# Patient Record
Sex: MALE | Race: WHITE | Employment: OTHER | URBAN - METROPOLITAN AREA
[De-identification: names, ages, dates, MRNs, and addresses within clinical notes are randomized per-mention and may not be internally consistent; named-entity substitution may affect disease eponyms.]

---

## 2018-12-28 ENCOUNTER — HOSPITAL ENCOUNTER (INPATIENT)
Age: 63
LOS: 1 days | Discharge: HOME | DRG: 246 | End: 2018-12-30
Attending: EMERGENCY MEDICINE | Admitting: HOSPITALIST

## 2018-12-28 ENCOUNTER — APPOINTMENT (OUTPATIENT)
Dept: GENERAL RADIOLOGY | Age: 63
DRG: 246 | End: 2018-12-28

## 2018-12-28 DIAGNOSIS — R77.8 ELEVATED TROPONIN: ICD-10-CM

## 2018-12-28 DIAGNOSIS — I21.4 NSTEMI (NON-ST ELEVATED MYOCARDIAL INFARCTION) (HCC): Primary | ICD-10-CM

## 2018-12-28 DIAGNOSIS — R07.9 CHEST PAIN, UNSPECIFIED TYPE: ICD-10-CM

## 2018-12-28 LAB
ALBUMIN SERPL-MCNC: 4.1 GM/DL (ref 3.4–5)
ALP BLD-CCNC: 74 IU/L (ref 40–129)
ALT SERPL-CCNC: 23 U/L (ref 10–40)
ANION GAP SERPL CALCULATED.3IONS-SCNC: 12 MMOL/L (ref 4–16)
AST SERPL-CCNC: 41 IU/L (ref 15–37)
BASOPHILS ABSOLUTE: 0.1 K/CU MM
BASOPHILS RELATIVE PERCENT: 0.8 % (ref 0–1)
BILIRUB SERPL-MCNC: 0.1 MG/DL (ref 0–1)
BUN BLDV-MCNC: 24 MG/DL (ref 6–23)
CALCIUM SERPL-MCNC: 9.3 MG/DL (ref 8.3–10.6)
CHLORIDE BLD-SCNC: 98 MMOL/L (ref 99–110)
CO2: 29 MMOL/L (ref 21–32)
CREAT SERPL-MCNC: 1 MG/DL (ref 0.9–1.3)
DIFFERENTIAL TYPE: ABNORMAL
EOSINOPHILS ABSOLUTE: 0.3 K/CU MM
EOSINOPHILS RELATIVE PERCENT: 4.2 % (ref 0–3)
GFR AFRICAN AMERICAN: >60 ML/MIN/1.73M2
GFR NON-AFRICAN AMERICAN: >60 ML/MIN/1.73M2
GLUCOSE BLD-MCNC: 99 MG/DL (ref 70–99)
HCT VFR BLD CALC: 46 % (ref 42–52)
HEMOGLOBIN: 14.7 GM/DL (ref 13.5–18)
IMMATURE NEUTROPHIL %: 0.1 % (ref 0–0.43)
LYMPHOCYTES ABSOLUTE: 2.7 K/CU MM
LYMPHOCYTES RELATIVE PERCENT: 34.6 % (ref 24–44)
MCH RBC QN AUTO: 29.1 PG (ref 27–31)
MCHC RBC AUTO-ENTMCNC: 32 % (ref 32–36)
MCV RBC AUTO: 90.9 FL (ref 78–100)
MONOCYTES ABSOLUTE: 0.7 K/CU MM
MONOCYTES RELATIVE PERCENT: 9 % (ref 0–4)
NUCLEATED RBC %: 0 %
PDW BLD-RTO: 13.2 % (ref 11.7–14.9)
PLATELET # BLD: 287 K/CU MM (ref 140–440)
PMV BLD AUTO: 9.9 FL (ref 7.5–11.1)
POTASSIUM SERPL-SCNC: 3.6 MMOL/L (ref 3.5–5.1)
RBC # BLD: 5.06 M/CU MM (ref 4.6–6.2)
SEGMENTED NEUTROPHILS ABSOLUTE COUNT: 3.9 K/CU MM
SEGMENTED NEUTROPHILS RELATIVE PERCENT: 51.3 % (ref 36–66)
SODIUM BLD-SCNC: 139 MMOL/L (ref 135–145)
TOTAL IMMATURE NEUTOROPHIL: 0.01 K/CU MM
TOTAL NUCLEATED RBC: 0 K/CU MM
TOTAL PROTEIN: 6.9 GM/DL (ref 6.4–8.2)
TROPONIN T: 0.73 NG/ML
WBC # BLD: 7.7 K/CU MM (ref 4–10.5)

## 2018-12-28 PROCEDURE — 85025 COMPLETE CBC W/AUTO DIFF WBC: CPT

## 2018-12-28 PROCEDURE — 85730 THROMBOPLASTIN TIME PARTIAL: CPT

## 2018-12-28 PROCEDURE — 71046 X-RAY EXAM CHEST 2 VIEWS: CPT

## 2018-12-28 PROCEDURE — 6370000000 HC RX 637 (ALT 250 FOR IP): Performed by: EMERGENCY MEDICINE

## 2018-12-28 PROCEDURE — 84484 ASSAY OF TROPONIN QUANT: CPT

## 2018-12-28 PROCEDURE — 36415 COLL VENOUS BLD VENIPUNCTURE: CPT

## 2018-12-28 PROCEDURE — 99285 EMERGENCY DEPT VISIT HI MDM: CPT

## 2018-12-28 PROCEDURE — 93005 ELECTROCARDIOGRAM TRACING: CPT | Performed by: EMERGENCY MEDICINE

## 2018-12-28 PROCEDURE — 80053 COMPREHEN METABOLIC PANEL: CPT

## 2018-12-28 RX ORDER — HEPARIN SODIUM 1000 [USP'U]/ML
4000 INJECTION, SOLUTION INTRAVENOUS; SUBCUTANEOUS ONCE
Status: COMPLETED | OUTPATIENT
Start: 2018-12-28 | End: 2018-12-29

## 2018-12-28 RX ORDER — ASPIRIN 325 MG
325 TABLET ORAL ONCE
Status: COMPLETED | OUTPATIENT
Start: 2018-12-28 | End: 2018-12-28

## 2018-12-28 RX ORDER — HEPARIN SODIUM 1000 [USP'U]/ML
4000 INJECTION, SOLUTION INTRAVENOUS; SUBCUTANEOUS PRN
Status: DISCONTINUED | OUTPATIENT
Start: 2018-12-28 | End: 2018-12-29

## 2018-12-28 RX ORDER — HEPARIN SODIUM 10000 [USP'U]/100ML
10.6 INJECTION, SOLUTION INTRAVENOUS CONTINUOUS
Status: DISCONTINUED | OUTPATIENT
Start: 2018-12-28 | End: 2018-12-29

## 2018-12-28 RX ORDER — HEPARIN SODIUM 1000 [USP'U]/ML
2000 INJECTION, SOLUTION INTRAVENOUS; SUBCUTANEOUS PRN
Status: DISCONTINUED | OUTPATIENT
Start: 2018-12-28 | End: 2018-12-29

## 2018-12-28 RX ADMIN — ASPIRIN 325 MG ORAL TABLET 325 MG: 325 PILL ORAL at 23:41

## 2018-12-28 ASSESSMENT — PAIN DESCRIPTION - ORIENTATION: ORIENTATION: MID

## 2018-12-28 ASSESSMENT — PAIN DESCRIPTION - PAIN TYPE: TYPE: ACUTE PAIN

## 2018-12-28 ASSESSMENT — PAIN DESCRIPTION - LOCATION: LOCATION: CHEST

## 2018-12-28 ASSESSMENT — PAIN SCALES - GENERAL: PAINLEVEL_OUTOF10: 4

## 2018-12-29 ENCOUNTER — DIRECT ADMIT ORDERS (OUTPATIENT)
Dept: CARDIOLOGY | Age: 63
End: 2018-12-29

## 2018-12-29 PROBLEM — I21.4 NSTEMI (NON-ST ELEVATED MYOCARDIAL INFARCTION) (HCC): Status: ACTIVE | Noted: 2018-12-29

## 2018-12-29 LAB
ACTIVATED CLOTTING TIME, LOW RANGE: 134 SEC
ACTIVATED CLOTTING TIME, LOW RANGE: >400 SEC
APTT: 26.1 SECONDS (ref 21.2–33)
APTT: 27 SECONDS (ref 21.2–33)
CHOLESTEROL: 281 MG/DL
EKG ATRIAL RATE: 75 BPM
EKG DIAGNOSIS: NORMAL
EKG P AXIS: 32 DEGREES
EKG P-R INTERVAL: 158 MS
EKG Q-T INTERVAL: 370 MS
EKG QRS DURATION: 102 MS
EKG QTC CALCULATION (BAZETT): 413 MS
EKG R AXIS: -33 DEGREES
EKG T AXIS: -29 DEGREES
EKG VENTRICULAR RATE: 75 BPM
HDLC SERPL-MCNC: 52 MG/DL
LDL CHOLESTEROL DIRECT: 233 MG/DL
LV EF: 53 %
LVEF MODALITY: NORMAL
TRIGL SERPL-MCNC: 124 MG/DL
TROPONIN T: 0.88 NG/ML
TROPONIN T: 0.99 NG/ML

## 2018-12-29 PROCEDURE — C1769 GUIDE WIRE: HCPCS

## 2018-12-29 PROCEDURE — 36415 COLL VENOUS BLD VENIPUNCTURE: CPT

## 2018-12-29 PROCEDURE — 4A023N7 MEASUREMENT OF CARDIAC SAMPLING AND PRESSURE, LEFT HEART, PERCUTANEOUS APPROACH: ICD-10-PCS | Performed by: INTERNAL MEDICINE

## 2018-12-29 PROCEDURE — 6360000004 HC RX CONTRAST MEDICATION

## 2018-12-29 PROCEDURE — 2500000003 HC RX 250 WO HCPCS

## 2018-12-29 PROCEDURE — B2111ZZ FLUOROSCOPY OF MULTIPLE CORONARY ARTERIES USING LOW OSMOLAR CONTRAST: ICD-10-PCS | Performed by: INTERNAL MEDICINE

## 2018-12-29 PROCEDURE — C1894 INTRO/SHEATH, NON-LASER: HCPCS

## 2018-12-29 PROCEDURE — 6370000000 HC RX 637 (ALT 250 FOR IP)

## 2018-12-29 PROCEDURE — 84484 ASSAY OF TROPONIN QUANT: CPT

## 2018-12-29 PROCEDURE — 94761 N-INVAS EAR/PLS OXIMETRY MLT: CPT

## 2018-12-29 PROCEDURE — 85347 COAGULATION TIME ACTIVATED: CPT

## 2018-12-29 PROCEDURE — C1887 CATHETER, GUIDING: HCPCS

## 2018-12-29 PROCEDURE — 2140000000 HC CCU INTERMEDIATE R&B

## 2018-12-29 PROCEDURE — 92928 PRQ TCAT PLMT NTRAC ST 1 LES: CPT

## 2018-12-29 PROCEDURE — 85730 THROMBOPLASTIN TIME PARTIAL: CPT

## 2018-12-29 PROCEDURE — 2580000003 HC RX 258: Performed by: INTERNAL MEDICINE

## 2018-12-29 PROCEDURE — 93458 L HRT ARTERY/VENTRICLE ANGIO: CPT

## 2018-12-29 PROCEDURE — 027137Z DILATION OF CORONARY ARTERY, TWO ARTERIES WITH FOUR OR MORE DRUG-ELUTING INTRALUMINAL DEVICES, PERCUTANEOUS APPROACH: ICD-10-PCS | Performed by: INTERNAL MEDICINE

## 2018-12-29 PROCEDURE — 6360000002 HC RX W HCPCS: Performed by: EMERGENCY MEDICINE

## 2018-12-29 PROCEDURE — C1725 CATH, TRANSLUMIN NON-LASER: HCPCS

## 2018-12-29 PROCEDURE — 92928 PRQ TCAT PLMT NTRAC ST 1 LES: CPT | Performed by: INTERNAL MEDICINE

## 2018-12-29 PROCEDURE — B2151ZZ FLUOROSCOPY OF LEFT HEART USING LOW OSMOLAR CONTRAST: ICD-10-PCS | Performed by: INTERNAL MEDICINE

## 2018-12-29 PROCEDURE — C1874 STENT, COATED/COV W/DEL SYS: HCPCS

## 2018-12-29 PROCEDURE — 83721 ASSAY OF BLOOD LIPOPROTEIN: CPT

## 2018-12-29 PROCEDURE — 93458 L HRT ARTERY/VENTRICLE ANGIO: CPT | Performed by: INTERNAL MEDICINE

## 2018-12-29 PROCEDURE — 2580000003 HC RX 258: Performed by: HOSPITALIST

## 2018-12-29 PROCEDURE — 6370000000 HC RX 637 (ALT 250 FOR IP): Performed by: HOSPITALIST

## 2018-12-29 PROCEDURE — 80061 LIPID PANEL: CPT

## 2018-12-29 PROCEDURE — 6360000002 HC RX W HCPCS

## 2018-12-29 PROCEDURE — 2709999900 HC NON-CHARGEABLE SUPPLY

## 2018-12-29 PROCEDURE — 6370000000 HC RX 637 (ALT 250 FOR IP): Performed by: INTERNAL MEDICINE

## 2018-12-29 PROCEDURE — 2580000003 HC RX 258

## 2018-12-29 PROCEDURE — C1760 CLOSURE DEV, VASC: HCPCS

## 2018-12-29 PROCEDURE — 99253 IP/OBS CNSLTJ NEW/EST LOW 45: CPT | Performed by: INTERNAL MEDICINE

## 2018-12-29 RX ORDER — SODIUM CHLORIDE 0.9 % (FLUSH) 0.9 %
10 SYRINGE (ML) INJECTION PRN
Status: DISCONTINUED | OUTPATIENT
Start: 2018-12-29 | End: 2018-12-29

## 2018-12-29 RX ORDER — HEPARIN SODIUM 1000 [USP'U]/ML
30 INJECTION, SOLUTION INTRAVENOUS; SUBCUTANEOUS PRN
Status: DISCONTINUED | OUTPATIENT
Start: 2018-12-29 | End: 2018-12-29

## 2018-12-29 RX ORDER — DIAZEPAM 5 MG/1
5 TABLET ORAL
Status: CANCELLED | OUTPATIENT
Start: 2018-12-29 | End: 2018-12-29

## 2018-12-29 RX ORDER — DIPHENHYDRAMINE HCL 25 MG
25 TABLET ORAL
Status: CANCELLED | OUTPATIENT
Start: 2018-12-29 | End: 2018-12-29

## 2018-12-29 RX ORDER — ACETAMINOPHEN 325 MG/1
650 TABLET ORAL EVERY 4 HOURS PRN
Status: DISCONTINUED | OUTPATIENT
Start: 2018-12-29 | End: 2018-12-30 | Stop reason: HOSPADM

## 2018-12-29 RX ORDER — SODIUM CHLORIDE 0.9 % (FLUSH) 0.9 %
10 SYRINGE (ML) INJECTION EVERY 12 HOURS SCHEDULED
Status: DISCONTINUED | OUTPATIENT
Start: 2018-12-29 | End: 2018-12-29

## 2018-12-29 RX ORDER — LISINOPRIL 5 MG/1
5 TABLET ORAL DAILY
Status: DISCONTINUED | OUTPATIENT
Start: 2018-12-29 | End: 2018-12-30 | Stop reason: HOSPADM

## 2018-12-29 RX ORDER — DIPHENHYDRAMINE HCL 25 MG
25 TABLET ORAL
Status: COMPLETED | OUTPATIENT
Start: 2018-12-29 | End: 2018-12-29

## 2018-12-29 RX ORDER — ATORVASTATIN CALCIUM 40 MG/1
80 TABLET, FILM COATED ORAL NIGHTLY
Status: DISCONTINUED | OUTPATIENT
Start: 2018-12-29 | End: 2018-12-30 | Stop reason: HOSPADM

## 2018-12-29 RX ORDER — FAMOTIDINE 20 MG/1
20 TABLET, FILM COATED ORAL 2 TIMES DAILY
Status: DISCONTINUED | OUTPATIENT
Start: 2018-12-29 | End: 2018-12-30 | Stop reason: HOSPADM

## 2018-12-29 RX ORDER — SODIUM CHLORIDE 0.9 % (FLUSH) 0.9 %
10 SYRINGE (ML) INJECTION PRN
Status: CANCELLED | OUTPATIENT
Start: 2018-12-29

## 2018-12-29 RX ORDER — NITROGLYCERIN 0.4 MG/1
0.4 TABLET SUBLINGUAL EVERY 5 MIN PRN
Status: DISCONTINUED | OUTPATIENT
Start: 2018-12-29 | End: 2018-12-30 | Stop reason: HOSPADM

## 2018-12-29 RX ORDER — ONDANSETRON 2 MG/ML
4 INJECTION INTRAMUSCULAR; INTRAVENOUS EVERY 6 HOURS PRN
Status: DISCONTINUED | OUTPATIENT
Start: 2018-12-29 | End: 2018-12-30 | Stop reason: HOSPADM

## 2018-12-29 RX ORDER — DIAZEPAM 5 MG/1
5 TABLET ORAL
Status: DISCONTINUED | OUTPATIENT
Start: 2018-12-29 | End: 2018-12-29

## 2018-12-29 RX ORDER — SODIUM CHLORIDE 0.9 % (FLUSH) 0.9 %
10 SYRINGE (ML) INJECTION EVERY 12 HOURS SCHEDULED
Status: CANCELLED | OUTPATIENT
Start: 2018-12-29

## 2018-12-29 RX ORDER — ATORVASTATIN CALCIUM 40 MG/1
40 TABLET, FILM COATED ORAL NIGHTLY
Status: DISCONTINUED | OUTPATIENT
Start: 2018-12-29 | End: 2018-12-29

## 2018-12-29 RX ORDER — SODIUM CHLORIDE 9 MG/ML
INJECTION, SOLUTION INTRAVENOUS CONTINUOUS
Status: DISCONTINUED | OUTPATIENT
Start: 2018-12-29 | End: 2018-12-29

## 2018-12-29 RX ORDER — SODIUM CHLORIDE 0.9 % (FLUSH) 0.9 %
10 SYRINGE (ML) INJECTION EVERY 12 HOURS SCHEDULED
Status: DISCONTINUED | OUTPATIENT
Start: 2018-12-29 | End: 2018-12-30 | Stop reason: HOSPADM

## 2018-12-29 RX ORDER — ASPIRIN 81 MG/1
81 TABLET, CHEWABLE ORAL DAILY
Status: DISCONTINUED | OUTPATIENT
Start: 2018-12-30 | End: 2018-12-30 | Stop reason: HOSPADM

## 2018-12-29 RX ORDER — HEPARIN SODIUM 1000 [USP'U]/ML
60 INJECTION, SOLUTION INTRAVENOUS; SUBCUTANEOUS PRN
Status: DISCONTINUED | OUTPATIENT
Start: 2018-12-29 | End: 2018-12-29

## 2018-12-29 RX ORDER — SODIUM CHLORIDE 9 MG/ML
INJECTION, SOLUTION INTRAVENOUS CONTINUOUS
Status: DISPENSED | OUTPATIENT
Start: 2018-12-29 | End: 2018-12-30

## 2018-12-29 RX ORDER — SODIUM CHLORIDE 9 MG/ML
INJECTION, SOLUTION INTRAVENOUS CONTINUOUS
Status: CANCELLED | OUTPATIENT
Start: 2018-12-29

## 2018-12-29 RX ORDER — METOPROLOL SUCCINATE 25 MG/1
25 TABLET, EXTENDED RELEASE ORAL DAILY
Status: DISCONTINUED | OUTPATIENT
Start: 2018-12-29 | End: 2018-12-30 | Stop reason: HOSPADM

## 2018-12-29 RX ORDER — SODIUM CHLORIDE 0.9 % (FLUSH) 0.9 %
10 SYRINGE (ML) INJECTION PRN
Status: DISCONTINUED | OUTPATIENT
Start: 2018-12-29 | End: 2018-12-30 | Stop reason: HOSPADM

## 2018-12-29 RX ADMIN — SODIUM CHLORIDE: 9 INJECTION, SOLUTION INTRAVENOUS at 19:59

## 2018-12-29 RX ADMIN — TICAGRELOR 90 MG: 90 TABLET ORAL at 20:03

## 2018-12-29 RX ADMIN — HEPARIN SODIUM AND DEXTROSE 10.6 UNITS/KG/HR: 10000; 5 INJECTION INTRAVENOUS at 02:47

## 2018-12-29 RX ADMIN — SODIUM CHLORIDE: 9 INJECTION, SOLUTION INTRAVENOUS at 14:18

## 2018-12-29 RX ADMIN — HEPARIN SODIUM 4000 UNITS: 1000 INJECTION, SOLUTION INTRAVENOUS; SUBCUTANEOUS at 02:46

## 2018-12-29 RX ADMIN — SODIUM CHLORIDE, PRESERVATIVE FREE 10 ML: 5 INJECTION INTRAVENOUS at 02:47

## 2018-12-29 RX ADMIN — DIPHENHYDRAMINE HCL 25 MG: 25 TABLET ORAL at 10:00

## 2018-12-29 RX ADMIN — SODIUM CHLORIDE: 9 INJECTION, SOLUTION INTRAVENOUS at 09:15

## 2018-12-29 RX ADMIN — METOPROLOL SUCCINATE 25 MG: 25 TABLET, EXTENDED RELEASE ORAL at 08:50

## 2018-12-29 ASSESSMENT — ENCOUNTER SYMPTOMS
COUGH: 0
CONSTIPATION: 0
SORE THROAT: 0
DIARRHEA: 0
ABDOMINAL PAIN: 0
BACK PAIN: 0
VOMITING: 0
SHORTNESS OF BREATH: 0
NAUSEA: 1
EYE REDNESS: 0
RHINORRHEA: 0

## 2018-12-29 ASSESSMENT — PAIN SCALES - GENERAL: PAINLEVEL_OUTOF10: 0

## 2018-12-30 VITALS
WEIGHT: 205.19 LBS | HEART RATE: 93 BPM | RESPIRATION RATE: 21 BRPM | DIASTOLIC BLOOD PRESSURE: 77 MMHG | SYSTOLIC BLOOD PRESSURE: 119 MMHG | BODY MASS INDEX: 27.79 KG/M2 | HEIGHT: 72 IN | TEMPERATURE: 97.3 F | OXYGEN SATURATION: 100 %

## 2018-12-30 LAB
ANION GAP SERPL CALCULATED.3IONS-SCNC: 10 MMOL/L (ref 4–16)
BUN BLDV-MCNC: 15 MG/DL (ref 6–23)
CALCIUM SERPL-MCNC: 8.9 MG/DL (ref 8.3–10.6)
CHLORIDE BLD-SCNC: 104 MMOL/L (ref 99–110)
CO2: 28 MMOL/L (ref 21–32)
CREAT SERPL-MCNC: 1 MG/DL (ref 0.9–1.3)
GFR AFRICAN AMERICAN: >60 ML/MIN/1.73M2
GFR NON-AFRICAN AMERICAN: >60 ML/MIN/1.73M2
GLUCOSE BLD-MCNC: 108 MG/DL (ref 70–99)
HCT VFR BLD CALC: 44.3 % (ref 42–52)
HEMOGLOBIN: 14.1 GM/DL (ref 13.5–18)
MCH RBC QN AUTO: 28.6 PG (ref 27–31)
MCHC RBC AUTO-ENTMCNC: 31.8 % (ref 32–36)
MCV RBC AUTO: 89.9 FL (ref 78–100)
PDW BLD-RTO: 13.2 % (ref 11.7–14.9)
PLATELET # BLD: 249 K/CU MM (ref 140–440)
PMV BLD AUTO: 10.1 FL (ref 7.5–11.1)
POTASSIUM SERPL-SCNC: 3.9 MMOL/L (ref 3.5–5.1)
RBC # BLD: 4.93 M/CU MM (ref 4.6–6.2)
SODIUM BLD-SCNC: 142 MMOL/L (ref 135–145)
WBC # BLD: 8.1 K/CU MM (ref 4–10.5)

## 2018-12-30 PROCEDURE — 6370000000 HC RX 637 (ALT 250 FOR IP): Performed by: HOSPITALIST

## 2018-12-30 PROCEDURE — 80048 BASIC METABOLIC PNL TOTAL CA: CPT

## 2018-12-30 PROCEDURE — 99233 SBSQ HOSP IP/OBS HIGH 50: CPT | Performed by: INTERNAL MEDICINE

## 2018-12-30 PROCEDURE — 2580000003 HC RX 258: Performed by: INTERNAL MEDICINE

## 2018-12-30 PROCEDURE — 85730 THROMBOPLASTIN TIME PARTIAL: CPT

## 2018-12-30 PROCEDURE — 36415 COLL VENOUS BLD VENIPUNCTURE: CPT

## 2018-12-30 PROCEDURE — 6370000000 HC RX 637 (ALT 250 FOR IP): Performed by: INTERNAL MEDICINE

## 2018-12-30 PROCEDURE — 85027 COMPLETE CBC AUTOMATED: CPT

## 2018-12-30 RX ORDER — ATORVASTATIN CALCIUM 80 MG/1
80 TABLET, FILM COATED ORAL NIGHTLY
Qty: 30 TABLET | Refills: 0 | Status: SHIPPED | OUTPATIENT
Start: 2018-12-30 | End: 2019-01-28 | Stop reason: SDUPTHER

## 2018-12-30 RX ORDER — METOPROLOL SUCCINATE 25 MG/1
25 TABLET, EXTENDED RELEASE ORAL DAILY
Qty: 30 TABLET | Refills: 0 | Status: SHIPPED | OUTPATIENT
Start: 2018-12-31 | End: 2019-03-04 | Stop reason: SDUPTHER

## 2018-12-30 RX ORDER — FAMOTIDINE 20 MG/1
20 TABLET, FILM COATED ORAL 2 TIMES DAILY
Qty: 60 TABLET | Refills: 0 | Status: SHIPPED | OUTPATIENT
Start: 2018-12-30 | End: 2019-01-23

## 2018-12-30 RX ORDER — NITROGLYCERIN 0.4 MG/1
TABLET SUBLINGUAL
Qty: 25 TABLET | Refills: 0 | Status: SHIPPED | OUTPATIENT
Start: 2018-12-30

## 2018-12-30 RX ORDER — LISINOPRIL 5 MG/1
5 TABLET ORAL DAILY
Qty: 30 TABLET | Refills: 0 | Status: SHIPPED | OUTPATIENT
Start: 2018-12-31 | End: 2019-01-10

## 2018-12-30 RX ADMIN — METOPROLOL SUCCINATE 25 MG: 25 TABLET, EXTENDED RELEASE ORAL at 08:03

## 2018-12-30 RX ADMIN — ASPIRIN 81 MG 81 MG: 81 TABLET ORAL at 08:03

## 2018-12-30 RX ADMIN — SODIUM CHLORIDE, PRESERVATIVE FREE 10 ML: 5 INJECTION INTRAVENOUS at 08:03

## 2018-12-30 RX ADMIN — TICAGRELOR 90 MG: 90 TABLET ORAL at 08:03

## 2018-12-30 ASSESSMENT — PAIN SCALES - GENERAL: PAINLEVEL_OUTOF10: 0

## 2018-12-31 ENCOUNTER — TELEPHONE (OUTPATIENT)
Dept: CARDIOLOGY CLINIC | Age: 63
End: 2018-12-31

## 2019-01-10 ENCOUNTER — OFFICE VISIT (OUTPATIENT)
Dept: CARDIOLOGY CLINIC | Age: 64
End: 2019-01-10

## 2019-01-10 VITALS
SYSTOLIC BLOOD PRESSURE: 136 MMHG | WEIGHT: 201.4 LBS | HEIGHT: 72 IN | BODY MASS INDEX: 27.28 KG/M2 | DIASTOLIC BLOOD PRESSURE: 84 MMHG | HEART RATE: 63 BPM

## 2019-01-10 DIAGNOSIS — R07.9 CHEST PAIN, UNSPECIFIED TYPE: ICD-10-CM

## 2019-01-10 DIAGNOSIS — Z95.820 S/P ANGIOPLASTY WITH STENT: ICD-10-CM

## 2019-01-10 DIAGNOSIS — E78.5 DYSLIPIDEMIA: ICD-10-CM

## 2019-01-10 DIAGNOSIS — I25.10 CORONARY ARTERY DISEASE INVOLVING NATIVE CORONARY ARTERY OF NATIVE HEART WITHOUT ANGINA PECTORIS: Primary | ICD-10-CM

## 2019-01-10 DIAGNOSIS — I21.4 NSTEMI (NON-ST ELEVATED MYOCARDIAL INFARCTION) (HCC): ICD-10-CM

## 2019-01-10 PROCEDURE — 99213 OFFICE O/P EST LOW 20 MIN: CPT | Performed by: INTERNAL MEDICINE

## 2019-01-10 PROCEDURE — 93000 ELECTROCARDIOGRAM COMPLETE: CPT | Performed by: INTERNAL MEDICINE

## 2019-01-10 RX ORDER — CLOPIDOGREL BISULFATE 75 MG/1
75 TABLET ORAL DAILY
Qty: 30 TABLET | Refills: 5 | Status: SHIPPED | OUTPATIENT
Start: 2019-01-10 | End: 2019-07-05 | Stop reason: SDUPTHER

## 2019-01-14 ENCOUNTER — TELEPHONE (OUTPATIENT)
Dept: CARDIOLOGY CLINIC | Age: 64
End: 2019-01-14

## 2019-01-21 ENCOUNTER — PROCEDURE VISIT (OUTPATIENT)
Dept: CARDIOLOGY CLINIC | Age: 64
End: 2019-01-21

## 2019-01-21 VITALS
WEIGHT: 201 LBS | BODY MASS INDEX: 27.22 KG/M2 | HEIGHT: 72 IN | HEART RATE: 80 BPM | DIASTOLIC BLOOD PRESSURE: 78 MMHG | SYSTOLIC BLOOD PRESSURE: 122 MMHG

## 2019-01-21 DIAGNOSIS — I21.4 NSTEMI (NON-ST ELEVATED MYOCARDIAL INFARCTION) (HCC): ICD-10-CM

## 2019-01-21 DIAGNOSIS — R06.02 SHORTNESS OF BREATH: ICD-10-CM

## 2019-01-21 DIAGNOSIS — Z95.820 S/P ANGIOPLASTY WITH STENT: Primary | ICD-10-CM

## 2019-01-21 DIAGNOSIS — R07.9 CHEST PAIN, UNSPECIFIED TYPE: ICD-10-CM

## 2019-01-21 DIAGNOSIS — I25.10 CORONARY ARTERY DISEASE INVOLVING NATIVE CORONARY ARTERY OF NATIVE HEART WITHOUT ANGINA PECTORIS: ICD-10-CM

## 2019-01-21 DIAGNOSIS — E78.5 DYSLIPIDEMIA: ICD-10-CM

## 2019-01-21 PROCEDURE — 93015 CV STRESS TEST SUPVJ I&R: CPT | Performed by: INTERNAL MEDICINE

## 2019-01-23 ENCOUNTER — OFFICE VISIT (OUTPATIENT)
Dept: CARDIOLOGY CLINIC | Age: 64
End: 2019-01-23

## 2019-01-23 VITALS
HEART RATE: 78 BPM | BODY MASS INDEX: 27.22 KG/M2 | DIASTOLIC BLOOD PRESSURE: 70 MMHG | WEIGHT: 201 LBS | HEIGHT: 72 IN | SYSTOLIC BLOOD PRESSURE: 106 MMHG

## 2019-01-23 DIAGNOSIS — I25.10 CORONARY ARTERY DISEASE INVOLVING NATIVE CORONARY ARTERY OF NATIVE HEART WITHOUT ANGINA PECTORIS: Primary | ICD-10-CM

## 2019-01-23 DIAGNOSIS — E78.5 DYSLIPIDEMIA: ICD-10-CM

## 2019-01-23 DIAGNOSIS — I21.4 NSTEMI (NON-ST ELEVATED MYOCARDIAL INFARCTION) (HCC): ICD-10-CM

## 2019-01-23 DIAGNOSIS — Z95.820 S/P ANGIOPLASTY WITH STENT: ICD-10-CM

## 2019-01-23 DIAGNOSIS — R07.9 CHEST PAIN, UNSPECIFIED TYPE: ICD-10-CM

## 2019-01-23 DIAGNOSIS — R77.8 ELEVATED TROPONIN: ICD-10-CM

## 2019-01-23 PROCEDURE — 99213 OFFICE O/P EST LOW 20 MIN: CPT | Performed by: INTERNAL MEDICINE

## 2019-01-29 RX ORDER — ATORVASTATIN CALCIUM 80 MG/1
80 TABLET, FILM COATED ORAL NIGHTLY
Qty: 90 TABLET | Refills: 3 | Status: SHIPPED | OUTPATIENT
Start: 2019-01-29 | End: 2019-12-10

## 2019-01-30 ENCOUNTER — TELEPHONE (OUTPATIENT)
Dept: CARDIOLOGY CLINIC | Age: 64
End: 2019-01-30

## 2019-02-13 ENCOUNTER — HOSPITAL ENCOUNTER (OUTPATIENT)
Dept: CARDIAC REHAB | Age: 64
Setting detail: THERAPIES SERIES
Discharge: HOME | End: 2019-02-13

## 2019-02-19 ENCOUNTER — HOSPITAL ENCOUNTER (OUTPATIENT)
Dept: CARDIAC REHAB | Age: 64
Setting detail: THERAPIES SERIES
Discharge: HOME | End: 2019-02-19

## 2019-02-19 PROCEDURE — 93798 PHYS/QHP OP CAR RHAB W/ECG: CPT

## 2019-02-21 ENCOUNTER — HOSPITAL ENCOUNTER (OUTPATIENT)
Dept: CARDIAC REHAB | Age: 64
Setting detail: THERAPIES SERIES
Discharge: HOME | End: 2019-02-21

## 2019-02-21 PROCEDURE — 93798 PHYS/QHP OP CAR RHAB W/ECG: CPT

## 2019-02-25 ENCOUNTER — HOSPITAL ENCOUNTER (OUTPATIENT)
Dept: CARDIAC REHAB | Age: 64
Setting detail: THERAPIES SERIES
Discharge: HOME | End: 2019-02-25

## 2019-02-25 PROCEDURE — 93798 PHYS/QHP OP CAR RHAB W/ECG: CPT

## 2019-02-26 ENCOUNTER — HOSPITAL ENCOUNTER (OUTPATIENT)
Dept: CARDIAC REHAB | Age: 64
Setting detail: THERAPIES SERIES
Discharge: HOME | End: 2019-02-26

## 2019-02-26 PROCEDURE — 93798 PHYS/QHP OP CAR RHAB W/ECG: CPT

## 2019-02-28 ENCOUNTER — HOSPITAL ENCOUNTER (OUTPATIENT)
Dept: CARDIAC REHAB | Age: 64
Setting detail: THERAPIES SERIES
Discharge: HOME | End: 2019-02-28

## 2019-02-28 PROCEDURE — 93798 PHYS/QHP OP CAR RHAB W/ECG: CPT

## 2019-03-04 ENCOUNTER — OFFICE VISIT (OUTPATIENT)
Dept: CARDIOLOGY CLINIC | Age: 64
End: 2019-03-04

## 2019-03-04 ENCOUNTER — NURSE ONLY (OUTPATIENT)
Dept: CARDIOLOGY CLINIC | Age: 64
End: 2019-03-04

## 2019-03-04 ENCOUNTER — HOSPITAL ENCOUNTER (OUTPATIENT)
Dept: CARDIAC REHAB | Age: 64
Setting detail: THERAPIES SERIES
Discharge: HOME | End: 2019-03-04

## 2019-03-04 ENCOUNTER — TELEPHONE (OUTPATIENT)
Dept: CARDIOLOGY CLINIC | Age: 64
End: 2019-03-04

## 2019-03-04 VITALS
HEIGHT: 72 IN | HEART RATE: 67 BPM | WEIGHT: 198.4 LBS | DIASTOLIC BLOOD PRESSURE: 80 MMHG | SYSTOLIC BLOOD PRESSURE: 112 MMHG | BODY MASS INDEX: 26.87 KG/M2

## 2019-03-04 DIAGNOSIS — E78.5 DYSLIPIDEMIA: ICD-10-CM

## 2019-03-04 DIAGNOSIS — I21.4 NSTEMI (NON-ST ELEVATED MYOCARDIAL INFARCTION) (HCC): ICD-10-CM

## 2019-03-04 DIAGNOSIS — I25.10 CORONARY ARTERY DISEASE INVOLVING NATIVE CORONARY ARTERY OF NATIVE HEART WITHOUT ANGINA PECTORIS: ICD-10-CM

## 2019-03-04 DIAGNOSIS — Z95.820 S/P ANGIOPLASTY WITH STENT: ICD-10-CM

## 2019-03-04 DIAGNOSIS — R00.2 PALPITATIONS: Primary | ICD-10-CM

## 2019-03-04 DIAGNOSIS — R00.2 PALPITATIONS: ICD-10-CM

## 2019-03-04 DIAGNOSIS — I25.10 CORONARY ARTERY DISEASE INVOLVING NATIVE CORONARY ARTERY OF NATIVE HEART WITHOUT ANGINA PECTORIS: Primary | ICD-10-CM

## 2019-03-04 PROCEDURE — 93000 ELECTROCARDIOGRAM COMPLETE: CPT | Performed by: INTERNAL MEDICINE

## 2019-03-04 PROCEDURE — 93798 PHYS/QHP OP CAR RHAB W/ECG: CPT

## 2019-03-04 PROCEDURE — 99213 OFFICE O/P EST LOW 20 MIN: CPT | Performed by: INTERNAL MEDICINE

## 2019-03-04 RX ORDER — METOPROLOL SUCCINATE 25 MG/1
25 TABLET, EXTENDED RELEASE ORAL DAILY
Qty: 30 TABLET | Refills: 5 | Status: SHIPPED | OUTPATIENT
Start: 2019-03-04 | End: 2019-03-18

## 2019-03-05 ENCOUNTER — HOSPITAL ENCOUNTER (OUTPATIENT)
Dept: CARDIAC REHAB | Age: 64
Setting detail: THERAPIES SERIES
Discharge: HOME | End: 2019-03-05

## 2019-03-05 PROCEDURE — 93798 PHYS/QHP OP CAR RHAB W/ECG: CPT

## 2019-03-07 ENCOUNTER — HOSPITAL ENCOUNTER (OUTPATIENT)
Dept: CARDIAC REHAB | Age: 64
Setting detail: THERAPIES SERIES
Discharge: HOME | End: 2019-03-07

## 2019-03-07 PROCEDURE — 93798 PHYS/QHP OP CAR RHAB W/ECG: CPT

## 2019-03-11 ENCOUNTER — HOSPITAL ENCOUNTER (OUTPATIENT)
Dept: CARDIAC REHAB | Age: 64
Setting detail: THERAPIES SERIES
Discharge: HOME | End: 2019-03-11

## 2019-03-11 PROCEDURE — 93798 PHYS/QHP OP CAR RHAB W/ECG: CPT

## 2019-03-12 ENCOUNTER — HOSPITAL ENCOUNTER (OUTPATIENT)
Dept: CARDIAC REHAB | Age: 64
Setting detail: THERAPIES SERIES
Discharge: HOME | End: 2019-03-12

## 2019-03-12 PROCEDURE — 93798 PHYS/QHP OP CAR RHAB W/ECG: CPT

## 2019-03-14 ENCOUNTER — HOSPITAL ENCOUNTER (OUTPATIENT)
Dept: CARDIAC REHAB | Age: 64
Setting detail: THERAPIES SERIES
Discharge: HOME | End: 2019-03-14

## 2019-03-14 PROCEDURE — 93798 PHYS/QHP OP CAR RHAB W/ECG: CPT

## 2019-03-18 ENCOUNTER — OFFICE VISIT (OUTPATIENT)
Dept: CARDIOLOGY CLINIC | Age: 64
End: 2019-03-18

## 2019-03-18 ENCOUNTER — APPOINTMENT (OUTPATIENT)
Dept: CARDIAC REHAB | Age: 64
End: 2019-03-18

## 2019-03-18 VITALS
BODY MASS INDEX: 27.17 KG/M2 | HEART RATE: 60 BPM | SYSTOLIC BLOOD PRESSURE: 130 MMHG | WEIGHT: 200.6 LBS | DIASTOLIC BLOOD PRESSURE: 86 MMHG | HEIGHT: 72 IN

## 2019-03-18 DIAGNOSIS — Z95.820 S/P ANGIOPLASTY WITH STENT: ICD-10-CM

## 2019-03-18 DIAGNOSIS — I25.10 CORONARY ARTERY DISEASE INVOLVING NATIVE CORONARY ARTERY OF NATIVE HEART WITHOUT ANGINA PECTORIS: Primary | ICD-10-CM

## 2019-03-18 DIAGNOSIS — E78.5 DYSLIPIDEMIA: ICD-10-CM

## 2019-03-18 DIAGNOSIS — I21.4 NSTEMI (NON-ST ELEVATED MYOCARDIAL INFARCTION) (HCC): ICD-10-CM

## 2019-03-18 DIAGNOSIS — R00.2 PALPITATIONS: ICD-10-CM

## 2019-03-18 PROCEDURE — 99214 OFFICE O/P EST MOD 30 MIN: CPT | Performed by: INTERNAL MEDICINE

## 2019-03-18 RX ORDER — METOPROLOL SUCCINATE 25 MG/1
25 TABLET, EXTENDED RELEASE ORAL EVERY 12 HOURS
Qty: 60 TABLET | Refills: 5 | Status: SHIPPED | OUTPATIENT
Start: 2019-03-18 | End: 2019-04-19

## 2019-03-19 ENCOUNTER — TELEPHONE (OUTPATIENT)
Dept: CARDIOLOGY CLINIC | Age: 64
End: 2019-03-19

## 2019-03-19 ENCOUNTER — APPOINTMENT (OUTPATIENT)
Dept: CARDIAC REHAB | Age: 64
End: 2019-03-19

## 2019-03-20 ENCOUNTER — TELEPHONE (OUTPATIENT)
Dept: CARDIOLOGY CLINIC | Age: 64
End: 2019-03-20

## 2019-03-21 ENCOUNTER — HOSPITAL ENCOUNTER (OUTPATIENT)
Dept: CARDIAC REHAB | Age: 64
Setting detail: THERAPIES SERIES
Discharge: HOME | End: 2019-03-21

## 2019-03-21 PROCEDURE — 93798 PHYS/QHP OP CAR RHAB W/ECG: CPT

## 2019-03-25 ENCOUNTER — HOSPITAL ENCOUNTER (OUTPATIENT)
Dept: CARDIAC REHAB | Age: 64
Setting detail: THERAPIES SERIES
Discharge: HOME | End: 2019-03-25

## 2019-03-25 PROCEDURE — 93798 PHYS/QHP OP CAR RHAB W/ECG: CPT

## 2019-03-26 ENCOUNTER — HOSPITAL ENCOUNTER (OUTPATIENT)
Dept: CARDIAC REHAB | Age: 64
Setting detail: THERAPIES SERIES
Discharge: HOME | End: 2019-03-26

## 2019-03-26 PROCEDURE — 93798 PHYS/QHP OP CAR RHAB W/ECG: CPT

## 2019-03-28 ENCOUNTER — HOSPITAL ENCOUNTER (OUTPATIENT)
Dept: CARDIAC REHAB | Age: 64
Setting detail: THERAPIES SERIES
Discharge: HOME | End: 2019-03-28

## 2019-03-28 PROCEDURE — 93798 PHYS/QHP OP CAR RHAB W/ECG: CPT

## 2019-04-03 ENCOUNTER — TELEPHONE (OUTPATIENT)
Dept: CARDIOLOGY CLINIC | Age: 64
End: 2019-04-03

## 2019-04-08 NOTE — TELEPHONE ENCOUNTER
Spoke with patient, he has not heard from PASS. Advised that I will call PASS and advise after speaking with them. He voiced understanding.

## 2019-04-10 NOTE — TELEPHONE ENCOUNTER
Left message on voicemail to advise patient to return my call. Per [employer] rep, patient qualifies for [employer] PASS. Patient will need to fill out PASS forms so that we can refax them.

## 2019-04-19 ENCOUNTER — INITIAL CONSULT (OUTPATIENT)
Dept: CARDIOLOGY CLINIC | Age: 64
End: 2019-04-19

## 2019-04-19 VITALS
HEART RATE: 73 BPM | RESPIRATION RATE: 12 BRPM | OXYGEN SATURATION: 98 % | HEIGHT: 72 IN | SYSTOLIC BLOOD PRESSURE: 130 MMHG | DIASTOLIC BLOOD PRESSURE: 80 MMHG | BODY MASS INDEX: 26.55 KG/M2 | WEIGHT: 196 LBS

## 2019-04-19 DIAGNOSIS — I49.3 PVC (PREMATURE VENTRICULAR CONTRACTION): Primary | ICD-10-CM

## 2019-04-19 DIAGNOSIS — E78.5 DYSLIPIDEMIA: ICD-10-CM

## 2019-04-19 DIAGNOSIS — I25.10 CORONARY ARTERY DISEASE INVOLVING NATIVE CORONARY ARTERY OF NATIVE HEART WITHOUT ANGINA PECTORIS: ICD-10-CM

## 2019-04-19 DIAGNOSIS — R00.2 PALPITATIONS: ICD-10-CM

## 2019-04-19 DIAGNOSIS — Z95.820 S/P ANGIOPLASTY WITH STENT: ICD-10-CM

## 2019-04-19 DIAGNOSIS — I21.4 NSTEMI (NON-ST ELEVATED MYOCARDIAL INFARCTION) (HCC): ICD-10-CM

## 2019-04-19 PROCEDURE — 93000 ELECTROCARDIOGRAM COMPLETE: CPT | Performed by: INTERNAL MEDICINE

## 2019-04-19 PROCEDURE — 99243 OFF/OP CNSLTJ NEW/EST LOW 30: CPT | Performed by: INTERNAL MEDICINE

## 2019-04-19 RX ORDER — MULTIVITAMIN/IRON/FOLIC ACID 18MG-0.4MG
250 TABLET ORAL DAILY
Qty: 30 TABLET | Refills: 2 | Status: SHIPPED | OUTPATIENT
Start: 2019-04-19

## 2019-04-19 RX ORDER — METOPROLOL SUCCINATE 25 MG/1
TABLET, EXTENDED RELEASE ORAL
Qty: 90 TABLET | Refills: 5 | Status: SHIPPED | OUTPATIENT
Start: 2019-04-19 | End: 2020-06-10 | Stop reason: SDUPTHER

## 2019-04-19 NOTE — PROGRESS NOTES
Electrophysiology Consult Note      Reason for consultation:  PVC    Chief complaint : Palpitaitons    Referring physician:       Primary care physician: Shiva Mayberry DO      History of Present Illness:     Chief Complaint   Patient presents with    Irregular Heart Beat      patient here to discuss Holter results. Patient states he feels like his heart has to [employer] hard\" sometimes. Like it is beating two different beats. Metoprolol was increased from 25mg daily to BID and he says episodes have decreased. Since the increase he has not felt like his heart races but he still has palpitations. He does had some shortness of breath with exertion. He denies chest pain, dizziness, and edema. He drinks 1.5 cups of half regular and half decaf coffee. Pastmedical history:   Past Medical History:   Diagnosis Date    CAD (coronary artery disease)     History of exercise stress test 01/21/2019    treadmill    Hyperlipidemia     S/P PTCA (percutaneous transluminal coronary angioplasty)     Successful PCI of RCA & PDA with excellent results       Surgical history :   Past Surgical History:   Procedure Laterality Date    KNEE SURGERY      bilat       Family history: No family history on file. Social history :  reports that he has never smoked. He has never used smokeless tobacco. He reports that he does not drink alcohol or use drugs. No Known Allergies    Current Outpatient Medications on File Prior to Visit   Medication Sig Dispense Refill    atorvastatin (LIPITOR) 80 MG tablet Take 1 tablet by mouth nightly (Patient taking differently: Take 80 mg by mouth nightly Pt takes 3 days a week) 90 tablet 3    clopidogrel (PLAVIX) 75 MG tablet Take 1 tablet by mouth daily 30 tablet 5    nitroGLYCERIN (NITROSTAT) 0.4 MG SL tablet up to max of 3 total doses.  If no relief after 1 dose, call 911. 25 tablet 0    Multiple Vitamin (MULTIVITAMINS PO) Take 1 tablet by mouth      aspirin 81 MG tablet Take 81 mg by mouth daily       No current facility-administered medications on file prior to visit. Review of Systems:   Review of Systems   Constitutional: Positive for fatigue. Negative for activity change, chills and fever. HENT: Negative for congestion, ear pain and tinnitus. Eyes: Negative for photophobia, pain and visual disturbance. Respiratory: Negative for cough, chest tightness, shortness of breath and wheezing. Cardiovascular: Positive for palpitations. Negative for chest pain and leg swelling. Gastrointestinal: Negative for abdominal pain, blood in stool, constipation, diarrhea, nausea and vomiting. Endocrine: Negative for cold intolerance and heat intolerance. Genitourinary: Negative for dysuria, flank pain and hematuria. Musculoskeletal: Negative for arthralgias, back pain, myalgias and neck stiffness. Skin: Negative for color change and rash. Allergic/Immunologic: Negative for food allergies. Neurological: Negative for dizziness, light-headedness, numbness and headaches. Hematological: Does not bruise/bleed easily. Psychiatric/Behavioral: Negative for agitation, behavioral problems and confusion. Physical Examination:    /80   Pulse 73   Resp 12   Ht 6' (1.829 m)   Wt 196 lb (88.9 kg)   SpO2 98%   BMI 26.58 kg/m²    Wt Readings from Last 3 Encounters:   04/19/19 196 lb (88.9 kg)   03/18/19 200 lb 9.6 oz (91 kg)   03/04/19 198 lb 6.4 oz (90 kg)     Body mass index is 26.58 kg/m². Physical Exam   Constitutional: He is oriented to person, place, and time. He appears well-developed and well-nourished. No distress. HENT:   Head: Normocephalic and atraumatic. Eyes: Pupils are equal, round, and reactive to light. EOM are normal.   Neck: Normal range of motion. No JVD present. Cardiovascular: Normal rate and regular rhythm. Exam reveals no friction rub. No murmur heard.   Pulmonary/Chest: Effort normal and breath sounds normal. No respiratory distress. He has no wheezes. He has no rales. Abdominal: Soft. Bowel sounds are normal. He exhibits no distension. There is no tenderness. Musculoskeletal: He exhibits no edema or tenderness. Neurological: He is alert and oriented to person, place, and time. No cranial nerve deficit. Skin: Skin is warm and dry. Psychiatric: He has a normal mood and affect. CBC:   Lab Results   Component Value Date    WBC 8.1 12/30/2018    HGB 14.1 12/30/2018    HCT 44.3 12/30/2018     12/30/2018     Lipids:  Lab Results   Component Value Date    CHOL 281 (H) 12/29/2018    TRIG 124 12/29/2018    HDL 52 12/29/2018    LDLDIRECT 233 (H) 12/29/2018     PT/INR: No results found for: INR     BMP:    Lab Results   Component Value Date     12/30/2018    K 3.9 12/30/2018     12/30/2018    CO2 28 12/30/2018    BUN 15 12/30/2018     CMP:   Lab Results   Component Value Date    AST 41 (H) 12/28/2018    PROT 6.9 12/28/2018    BILITOT 0.1 12/28/2018    ALKPHOS 74 12/28/2018     TSH:  No results found for: TSH    EKGINTERPRETATION - EKG Interpretation:  Sinus rhythm, PVC      IMPRESSION / RECOMMENDATIONS:     1. PVC  2. History of NSTEMI  3. Cad SP PCI  4. HLD      NSTEMI in December  Patient LV endocardial PVCs  Patient burden is not high but given history of MI will be concerning  Get Echo to assess PVC  Discussed with patient in detail  Thought of getting cardiac MRI or nculear test but patient reported he was self pay and wanted some low cost test - so will start with echo    Will follow with echo  Check BMP and MAg  Increase metoprolol to 50mg in AM and 25mg in evening (Patient reports early morning dizziness)  Start low dose magnesium    Thanks again for allowing me to participate in care of this patient. Please call me if you have any questions. With best regards.       Estelle Tipton MD, 4/19/2019 9:49 AM

## 2019-04-22 ENCOUNTER — TELEPHONE (OUTPATIENT)
Dept: CARDIOLOGY CLINIC | Age: 64
End: 2019-04-22

## 2019-04-22 NOTE — TELEPHONE ENCOUNTER
My [employer]  Called and needs someone to return the call about the prior auth for this patient [id number]

## 2019-04-23 LAB
BUN / CREAT RATIO: 31 (CALC) (ref 7–25)
BUN BLDV-MCNC: 28 MG/DL (ref 3–29)
CALCIUM SERPL-MCNC: 9.4 MG/DL (ref 8.5–10.5)
CHLORIDE BLD-SCNC: 102 MEQ/L (ref 96–110)
CO2: 32 MEQ/L (ref 19–32)
COPY(IES) SENT TO:: NORMAL
CREAT SERPL-MCNC: 0.9 MG/DL
GFR SERPL CREATININE-BSD FRML MDRD: 91 ML/MIN/1.73M2
GLUCOSE BLD-MCNC: 100 MG/DL
MAGNESIUM: 2.2 MG/DL (ref 1.4–2.5)
POTASSIUM SERPL-SCNC: 4.5 MEQ/L (ref 3.4–5.3)
SODIUM BLD-SCNC: 144 MEQ/L (ref 135–148)

## 2019-04-25 ENCOUNTER — TELEPHONE (OUTPATIENT)
Dept: CARDIOLOGY CLINIC | Age: 64
End: 2019-04-25

## 2019-04-25 NOTE — TELEPHONE ENCOUNTER
Attempted to call patient on cell phone. No answer left message asking him return my call when available.

## 2019-04-26 NOTE — TELEPHONE ENCOUNTER
Spoke with patient advised him that Eric Iain does want him to get Rx for Magnesium and start it. Patient voiced understanding.

## 2019-04-28 ASSESSMENT — ENCOUNTER SYMPTOMS
DIARRHEA: 0
PHOTOPHOBIA: 0
VOMITING: 0
NAUSEA: 0
ABDOMINAL PAIN: 0
COUGH: 0
WHEEZING: 0
SHORTNESS OF BREATH: 0
BACK PAIN: 0
BLOOD IN STOOL: 0
EYE PAIN: 0
CHEST TIGHTNESS: 0
COLOR CHANGE: 0
CONSTIPATION: 0

## 2019-05-07 ENCOUNTER — TELEPHONE (OUTPATIENT)
Dept: CARDIOLOGY CLINIC | Age: 64
End: 2019-05-07

## 2019-05-07 ENCOUNTER — PROCEDURE VISIT (OUTPATIENT)
Dept: CARDIOLOGY CLINIC | Age: 64
End: 2019-05-07

## 2019-05-07 DIAGNOSIS — I21.4 NSTEMI (NON-ST ELEVATED MYOCARDIAL INFARCTION) (HCC): Primary | ICD-10-CM

## 2019-05-07 LAB
LV EF: 53 %
LVEF MODALITY: NORMAL

## 2019-05-07 PROCEDURE — 93306 TTE W/DOPPLER COMPLETE: CPT | Performed by: INTERNAL MEDICINE

## 2019-05-08 ENCOUNTER — TELEPHONE (OUTPATIENT)
Dept: CARDIOLOGY CLINIC | Age: 64
End: 2019-05-08

## 2019-05-13 ENCOUNTER — TELEPHONE (OUTPATIENT)
Dept: CARDIOLOGY CLINIC | Age: 64
End: 2019-05-13

## 2019-05-13 NOTE — TELEPHONE ENCOUNTER
Spoke with [employer] Patient Assistance today and patient has been approved through 12/31/19. Left message on voicemail for patient to return my call.

## 2019-05-14 ENCOUNTER — TELEPHONE (OUTPATIENT)
Dept: CARDIOLOGY CLINIC | Age: 64
End: 2019-05-14

## 2019-05-14 NOTE — TELEPHONE ENCOUNTER
Left message on voicemail to advise patient to contact [facility] to get set up to start receiving the medication. Patient to call [phone]. Patient given the number.

## 2019-06-12 ENCOUNTER — OFFICE VISIT (OUTPATIENT)
Dept: CARDIOLOGY CLINIC | Age: 64
End: 2019-06-12

## 2019-06-12 VITALS
OXYGEN SATURATION: 98 % | DIASTOLIC BLOOD PRESSURE: 62 MMHG | SYSTOLIC BLOOD PRESSURE: 102 MMHG | HEART RATE: 58 BPM | WEIGHT: 197.6 LBS | BODY MASS INDEX: 26.76 KG/M2 | HEIGHT: 72 IN | RESPIRATION RATE: 12 BRPM

## 2019-06-12 DIAGNOSIS — E78.5 DYSLIPIDEMIA: ICD-10-CM

## 2019-06-12 DIAGNOSIS — Z95.820 S/P ANGIOPLASTY WITH STENT: ICD-10-CM

## 2019-06-12 DIAGNOSIS — I25.10 CORONARY ARTERY DISEASE INVOLVING NATIVE CORONARY ARTERY OF NATIVE HEART WITHOUT ANGINA PECTORIS: Primary | ICD-10-CM

## 2019-06-12 PROCEDURE — 99213 OFFICE O/P EST LOW 20 MIN: CPT | Performed by: NURSE PRACTITIONER

## 2019-06-12 NOTE — PROGRESS NOTES
[facility] [address]  Phone: [phone]    Fax [phone]                  Kevin Padilla MD, Juli Barraza MD, [address] MD Suleman, MD Kash Wilson, MD Austyn Coyle, MD Aparna Negrete, MANOLO Mccartney      6/12/2019    RE: Hunter Goetz  (1955)                               TO:  Dr. Kayli Joy DO  The primary cardiologist is Dr. Dr. Torrie Turner     CC:   1. Coronary artery disease involving native coronary artery of native heart without angina pectoris    2. Dyslipidemia    3. S/P angioplasty with stent         HPI:    Thank you for involving me in taking care of your patient Hunter Goetz. He is a 59y.o. year old male with a history as listed above and is being seen in the office today. He reports that is feeling well. There is no chest pain or SOB. There are no reported palpitations. He does note dizziness with bending over and standing up fast. He feels tired with the increased dose of the Toprol. He is compliant with medications. He is active with walking for exercise. Exercise is limited d/t knee pain.         Vitals:    06/12/19 0808   BP: 102/62   Pulse: 58   Resp: 12   SpO2: 98%       Current Outpatient Medications   Medication Sig Dispense Refill    alirocumab (PRALUENT) 75 MG/ML SOPN injection pen Inject 1 mL into the skin every 14 days 1 pen 11    metoprolol succinate (TOPROL XL) 25 MG extended release tablet 50mg in the morning and 25mg in the evening 90 tablet 5    Magnesium Oxide 250 MG TABS Take 1 tablet by mouth daily 30 tablet 2    atorvastatin (LIPITOR) 80 MG tablet Take 1 tablet by mouth nightly (Patient taking differently: Take 80 mg by mouth nightly Pt takes 3 days a week) 90 tablet 3    clopidogrel (PLAVIX) 75 MG tablet Take 1 tablet by mouth daily 30 tablet 5    nitroGLYCERIN (NITROSTAT) 0.4 MG SL tablet up to max of 3 total doses. If no relief after 1 dose, call 911. 25 tablet 0    Multiple Vitamin (MULTIVITAMINS PO) Take 1 tablet by mouth      aspirin 81 MG tablet Take 81 mg by mouth daily       No current facility-administered medications for this visit. Allergies: Patient has no known allergies. Past Medical History:   Diagnosis Date    CAD (coronary artery disease)     History of echocardiogram 05/07/2019    EF 50-55%, Normal, no significant valvular disease or diastolic dysfunction    History of exercise stress test 01/21/2019    treadmill    Hyperlipidemia     S/P PTCA (percutaneous transluminal coronary angioplasty)     Successful PCI of RCA & PDA with excellent results     Past Surgical History:   Procedure Laterality Date    KNEE SURGERY      bilat     No family history on file. Social History     Tobacco Use    Smoking status: Never Smoker    Smokeless tobacco: Never Used   Substance Use Topics    Alcohol use: No        Review of Systems:   · Constitutional: No Fever,no unintentional weight Loss   · Eyes: No change in Vision:     · ENT: No Headaches, Hearing Loss or Vertigo. No tinnitus   · Cardiovascular: as per note above   · Respiratory: No cough or wheezing and as per note above.    · Gastrointestinal: no abdominal pain, no appetite loss, no blood in stools, constipation, or diarrhea, No heartburn  · Genitourinary: No dysuria, trouble voiding, or hematuria  · Musculoskeletal: back pain- No: myalgia Yes,  Arthralgia- Yes  · Integumentary: No rash or pruritis  · Neurological: No TIA or stroke symptoms  · Psychiatric: Anxiety- No: depression-  No   · Endocrine: No malaise, Yes fatigue - no temperature intolerance  · Hematologic/Lymphatic: No bleeding problems, blood clots or swollen lymph nodes  · Allergic/Immunologic: No nasal congestion or hives    Objective:      Physical Exam:  /62   Pulse 58   Resp 12   Ht 6' (1.829 m)   Wt 197 lb 9.6 oz (89.6 kg)   SpO2 98%   BMI 26.80 kg/m²   Wt Readings from Last 3 Encounters:   06/12/19 197 lb 9.6 oz (89.6 kg)   04/19/19 196 lb (88.9 kg)   03/18/19 200 lb 9.6 oz (91 kg)     Body mass index is 26.8 kg/m². GENERAL - Alert, oriented, pleasant, in no apparent distress. Head unremarkable  Eyes - pupils equal and reactive to light - bilateral conjunctiva are pink: sclera are white   ENT - external ears intact, nose is intact:  tongue is midline pink and moist  Neck - No jugular venous distention noted. No carotid bruits appreciated. Cardiovascular - Normal S1 and S2:  no murmur appreciated, No gallop. Regular rate- Yes,  rhythm regular-Yes. Extremities - No cyanosis, clubbing, no edema to lower legs. Pulmonary - No respiratory distress. No wheezes or rales. Chest is clear  Pulses: Bilateral radial pulses normal  Abdomen -  Soft no tenderness, non distended   Musculoskeletal - Normal movement of all extremities   Neurologic - alert and oriented: There are no gross focal neurologic abnormalities.    Skin-  No rash: No echymosis   Affect- normal mood and pleasant       DATA  BNP: No results found for: BNP, PROBNP  CBC:   Lab Results   Component Value Date    WBC 8.1 12/30/2018    RBC 4.93 12/30/2018    HGB 14.1 12/30/2018    HCT 44.3 12/30/2018     12/30/2018     CMP:    Lab Results   Component Value Date     04/22/2019    K 4.5 04/22/2019     04/22/2019    CO2 32 04/22/2019    BUN 28 04/22/2019    CREATININE 0.9 04/22/2019    GFRAA >60 12/30/2018    LABGLOM 91 04/22/2019    GLUCOSE 100 04/22/2019    CALCIUM 9.4 04/22/2019     Hepatic Function Panel:    Lab Results   Component Value Date    ALKPHOS 74 12/28/2018    ALT 23 12/28/2018    AST 41 12/28/2018    PROT 6.9 12/28/2018    BILITOT 0.1 12/28/2018    LABALBU 4.1 12/28/2018     Magnesium:    Lab Results   Component Value Date    MG 2.2 04/22/2019     PT/INR:  No results found for: PROTIME, INR  Lipids:    Lab Results   Component Value Date    TRIG 124 12/29/2018

## 2019-06-25 ENCOUNTER — TELEPHONE (OUTPATIENT)
Dept: CARDIOLOGY CLINIC | Age: 64
End: 2019-06-25

## 2019-06-25 NOTE — TELEPHONE ENCOUNTER
Left message on home & cell phone for patient. Need to reschedule 7/19 appointment with Tonya Chavarria or we can move him to later in morning with Becky.  Asked to call Mariah Hooks when available

## 2019-07-08 RX ORDER — CLOPIDOGREL BISULFATE 75 MG/1
75 TABLET ORAL DAILY
Qty: 60 TABLET | Refills: 4 | Status: SHIPPED | OUTPATIENT
Start: 2019-07-08 | End: 2020-06-10

## 2019-07-09 ENCOUNTER — TELEPHONE (OUTPATIENT)
Dept: CARDIOLOGY CLINIC | Age: 64
End: 2019-07-09

## 2019-11-06 ENCOUNTER — TELEPHONE (OUTPATIENT)
Dept: CARDIOLOGY CLINIC | Age: 64
End: 2019-11-06

## 2019-11-06 NOTE — TELEPHONE ENCOUNTER
Left message on voicemail for patient to stop in the office to complete renewal form for [employer] PASS and or call this RN back with further questions. Forms placed at the .

## 2019-12-10 ENCOUNTER — OFFICE VISIT (OUTPATIENT)
Dept: CARDIOLOGY CLINIC | Age: 64
End: 2019-12-10

## 2019-12-10 VITALS
SYSTOLIC BLOOD PRESSURE: 120 MMHG | BODY MASS INDEX: 28.25 KG/M2 | HEART RATE: 68 BPM | HEIGHT: 72 IN | WEIGHT: 208.6 LBS | DIASTOLIC BLOOD PRESSURE: 72 MMHG

## 2019-12-10 DIAGNOSIS — Z87.898 HISTORY OF PALPITATIONS: ICD-10-CM

## 2019-12-10 DIAGNOSIS — I25.10 CORONARY ARTERY DISEASE INVOLVING NATIVE CORONARY ARTERY OF NATIVE HEART WITHOUT ANGINA PECTORIS: Primary | ICD-10-CM

## 2019-12-10 DIAGNOSIS — E78.5 DYSLIPIDEMIA: ICD-10-CM

## 2019-12-10 PROCEDURE — 99214 OFFICE O/P EST MOD 30 MIN: CPT | Performed by: NURSE PRACTITIONER

## 2019-12-10 RX ORDER — TAMSULOSIN HYDROCHLORIDE 0.4 MG/1
0.4 CAPSULE ORAL
COMMUNITY
Start: 2019-11-06 | End: 2022-08-03

## 2020-01-17 NOTE — TELEPHONE ENCOUNTER
Spoke with Rep from [employer]. Patient approval is not set to  until . Will contact patient 20 for renewal forms.

## 2020-03-31 ENCOUNTER — TELEPHONE (OUTPATIENT)
Dept: CARDIOLOGY CLINIC | Age: 65
End: 2020-03-31

## 2020-04-07 ENCOUNTER — TELEPHONE (OUTPATIENT)
Dept: CARDIOLOGY CLINIC | Age: 65
End: 2020-04-07

## 2020-04-21 NOTE — TELEPHONE ENCOUNTER
Spoke with Michael LAWSON who advised that the patient has been approved for assistance. She will refax the approval.  Left message on voicemail to advise the patient of approval and to call the office if he has questions or further needs.

## 2020-06-05 LAB
ALBUMIN SERPL-MCNC: 4.6 G/DL
ALP BLD-CCNC: 68 U/L
ALT SERPL-CCNC: 23 U/L
ANION GAP SERPL CALCULATED.3IONS-SCNC: NORMAL MMOL/L
AST SERPL-CCNC: 24 U/L
BILIRUB SERPL-MCNC: 0.4 MG/DL (ref 0.1–1.4)
BUN BLDV-MCNC: 20 MG/DL
CALCIUM SERPL-MCNC: 9.8 MG/DL
CHLORIDE BLD-SCNC: 97 MMOL/L
CHOLESTEROL, TOTAL: 191 MG/DL
CHOLESTEROL/HDL RATIO: NORMAL
CO2: 29 MMOL/L
CREAT SERPL-MCNC: 1.1 MG/DL
GFR CALCULATED: NORMAL
GLUCOSE BLD-MCNC: 100 MG/DL
HDLC SERPL-MCNC: 52 MG/DL (ref 35–70)
LDL CHOLESTEROL CALCULATED: 119 MG/DL (ref 0–160)
POTASSIUM SERPL-SCNC: 4.2 MMOL/L
SODIUM BLD-SCNC: 142 MMOL/L
TOTAL PROTEIN: 6.7
TRIGL SERPL-MCNC: 100 MG/DL
VLDLC SERPL CALC-MCNC: 20 MG/DL

## 2020-06-10 ENCOUNTER — OFFICE VISIT (OUTPATIENT)
Dept: CARDIOLOGY CLINIC | Age: 65
End: 2020-06-10

## 2020-06-10 VITALS
WEIGHT: 209.2 LBS | HEART RATE: 60 BPM | HEIGHT: 72 IN | BODY MASS INDEX: 28.33 KG/M2 | DIASTOLIC BLOOD PRESSURE: 78 MMHG | SYSTOLIC BLOOD PRESSURE: 118 MMHG

## 2020-06-10 PROCEDURE — 99213 OFFICE O/P EST LOW 20 MIN: CPT | Performed by: INTERNAL MEDICINE

## 2020-06-10 RX ORDER — METOPROLOL SUCCINATE 25 MG/1
25 TABLET, EXTENDED RELEASE ORAL DAILY
Qty: 90 TABLET | Refills: 5 | Status: SHIPPED | OUTPATIENT
Start: 2020-06-10 | End: 2020-12-16

## 2020-06-10 NOTE — PROGRESS NOTES
12/29/2018    3 vessel CAD, where in Circumflex disease is mild, LAD, moderate & RCA critical with total occlusion    History of echocardiogram 05/07/2019    EF 50-55%, Normal, no significant valvular disease or diastolic dysfunction    History of exercise stress test 01/21/2019    treadmill    Hyperlipidemia     S/P PTCA (percutaneous transluminal coronary angioplasty) 12/29/2018    Successful PCI of RCA & PDA with excellent results     Past Surgical History:   Procedure Laterality Date    CARDIAC CATHETERIZATION  12/29/2018    3 vessel CAD, where in Circumflex disease is mild, LAD, moderate & RCA critical with total occlusion    CORONARY ANGIOPLASTY WITH STENT PLACEMENT  12/29/2018    Successful PCI of RCA & PDA with excellent results    KNEE SURGERY      bilat      As reviewed History reviewed. No pertinent family history. Social History     Tobacco Use    Smoking status: Never Smoker    Smokeless tobacco: Never Used   Substance Use Topics    Alcohol use: No      Review of Systems:    Constitutional: Negative for diaphoresis and fatigue  Psychological:Negative for anxiety or depression  HENT: Negative for headaches, nasal congestion, sinus pain or vertigo  Eyes: Negative for visual disturbance.    Endocrine: Negative for polydipsia/polyuria  Respiratory: Negative for shortness of breath  Cardiovascular: Negative for chest pain, dyspnea on exertion, claudication, edema, irregular heartbeat, murmur, palpitations or shortness of breath  Gastrointestinal: Negative for abdominal pain or heartburn  Genito-Urinary: Negative for urinary frequency/urgency  Musculoskeletal: Negative for muscle pain, muscular weakness, negative for pain in arm and leg or swelling in foot and leg  Neurological: Negative for dizziness, headaches, memory loss, numbness/tingling, visual changes, syncope  Dermatological: Negative for rash    Objective:  /78   Pulse 60   Ht 6' (1.829 m)   Wt 209 lb 3.2 oz (94.9 kg)   BMI 28.37 04/22/2019     12/30/2018    K 4.5 04/22/2019    K 3.9 12/30/2018     04/22/2019     12/30/2018    CO2 32 04/22/2019    CO2 28 12/30/2018    BUN 28 04/22/2019    BUN 15 12/30/2018    PROT 6.9 12/28/2018     TSH:  No results found for: TSH, TSHHS    QUALITY MEASURES REVIEWED:  1.CAD:Patient is taking anti platelet agent:Yes  2. DYSLIPIDEMIA: Patient is on cholesterol lowering medication:Yes  3. Beta-Blocker therapy for CAD, if prior Myocardial Infarction:Yes  4. Atrial fibrillation & anticoagulation therapy No  5. Discussed weight management strategies. Impression:    1. Coronary artery disease involving native coronary artery of native heart without angina pectoris    2. Palpitations    3. S/P angioplasty with stent    4. NSTEMI (non-ST elevated myocardial infarction) ([facility]    5. Dyslipidemia    6. PVC (premature ventricular contraction)       Patient Active Problem List   Diagnosis Code    NSTEMI (non-ST elevated myocardial infarction) ([facility] I21.4    Chest pain R07.9    Dyslipidemia E78.5    S/P angioplasty with stent Z95.820    Coronary artery disease involving native coronary artery of native heart without angina pectoris I25.10    History of palpitations Z87.898       Assessment & Plan:    CAD:Yes   clinically stable. Patient is on optimal medical regimen ( see medication list above )  -     CORONARY ARTERY DISEASE: Patient is currently  asymptomatic from CAD. - changes in  treatment:   no           - Testing ordered:  no  [facility] classification: 1  [place] RISK SCORE:  JAVED RISK SCORE:  HTN:well controlled on current medical regimen, see list above. - changes in  treatment:   no   CARDIOMYOPATHY: None known   CONGESTIVE HEART FAILURE: NO KNOWN HISTORY.     VHD: No significant VHD noted  DYSLIPIDEMIA: Patient's recent profile is not available.                                 Tolerating current medical regimen wellyes, See most recent Lab values in Labs section above. OTHER RELEVANT DIAGNOSIS:as noted in patient's active problem list:  SNORING IN THE NIGHT, IRREGULAR BREATHING & STRONG INTERMITTENT HEART BEATS: ? KRISSY  TESTS ORDERED:  Sleep Apnea testing. All previously ordered tests reviewed. ARRHYTHMIAS: H/O Increased CONRADO & tachy debbie episodes   MEDICATIONS: CPM   Office f/u in three months. Primary/secondary prevention is the goal by aggressive risk modification, healthy and therapeutic life style changes for cardiovascular risk reduction. Various goals are discussed and multiple questions answered.

## 2020-06-10 NOTE — LETTER
[address] [facility]  [address] [address]  Phone: [phone]  Fax: [phone]    Barrera Jimenez MD        Glory 10, 2020     Tessie Roberson DO  [facility] [address] [address]    Patient: Leyda Dobson  MR Number: [id number]  YOB: 1955  Date of Visit: 6/10/2020    Dear Dr. Tessie Roberson: Thank you for the request for consultation for Leyda Dobson to me for the evaluation of CAD. Below are the relevant portions of my assessment and plan of care. If you have questions, please do not hesitate to call me. I look forward to following Aneta Inman along with you.     Sincerely,        Barrera Jimenez MD

## 2020-06-10 NOTE — LETTER
Janiya Chaudhry [address]  1955  [id number]    Have you had any Chest Pain - No      Have you had any Shortness of Breath - Yes ongoing   If Yes - When at rest    Have you had any dizziness - No      Have you had any palpitations - No    Do you have any edema - No    Do you have a surgery or procedure scheduled in the near future - No

## 2020-07-15 ENCOUNTER — TELEPHONE (OUTPATIENT)
Dept: CARDIOLOGY CLINIC | Age: 65
End: 2020-07-15

## 2020-07-15 NOTE — TELEPHONE ENCOUNTER
Patient called stating that Dr. Candelaria Albright wants him to do a sleep study and he is getting that arranged but stated that he needs an up-to-date echo in order to get it scheduled. Please return his call at ph# [phone].

## 2020-11-05 ENCOUNTER — OFFICE VISIT (OUTPATIENT)
Dept: CARDIOLOGY CLINIC | Age: 65
End: 2020-11-05

## 2020-11-05 VITALS
HEIGHT: 72 IN | BODY MASS INDEX: 29.12 KG/M2 | SYSTOLIC BLOOD PRESSURE: 118 MMHG | HEART RATE: 77 BPM | WEIGHT: 215 LBS | DIASTOLIC BLOOD PRESSURE: 70 MMHG

## 2020-11-05 PROBLEM — G47.33 OSA (OBSTRUCTIVE SLEEP APNEA): Status: ACTIVE | Noted: 2020-11-05

## 2020-11-05 PROCEDURE — 99214 OFFICE O/P EST MOD 30 MIN: CPT | Performed by: INTERNAL MEDICINE

## 2020-11-05 PROCEDURE — 93000 ELECTROCARDIOGRAM COMPLETE: CPT | Performed by: INTERNAL MEDICINE

## 2020-11-05 NOTE — LETTER
Eliodoro Ehrlich [address]  1955  [id number]    Have you had any Chest Pain - No      Have you had any Shortness of Breath - No      Have you had any dizziness - No      Have you had any palpitations - Yes  If Yes DO EKG - Do you feel your heart racing  How long does it last - seconds     Is the patient on any of the following medications -   If Yes DO EKG    Do you have any edema - no    Do you have a surgery or procedure scheduled in the near future - Yes  If Yes- DO EKG  If Yes - Who is the surgery with?  Dr. Chandler Askew  Phone number of physician [phone]  Fax number of physician [phone]  Type of surgery Knee replacement  12/16/2020  BE SURE TO GIVE THIS INFORMATION to [facility]    Ask patient if they want to sign up for MyChart if they are not already signed up    Check to see if we have an E-MAIL on file for the patient    Check medication list thoroughly!!!  BE SURE TO ASK PATIENT IF THEY NEED MEDICATION REFILLS    At check out add to every patient's \"wrap up\" the following dot phrase AFTERHOURSEDUCATION and ensure we explain this to our patients

## 2020-11-05 NOTE — PROGRESS NOTES
OFFICE PROGRESS NOTES      Lulú Mckeon is a 60 y.o. male who has    CHIEF COMPLAINT AS FOLLOWS:   CHEST PAIN: Patient denies any C/O chest pains at this time.      SOB:  C/O SOB, apparently has been going on for a while.                LEG EDEMA: No leg edema   PALPITATIONS:   C/O brief episodes of palpitations.   DIZZINESS: No C/O Dizziness   SYNCOPE: None   OTHER:                                    HPI: Patient is here for F/U on his CAD & Dyslipidemia problems. He does not have any complaints at this time. Halina Galeazzi has the following history recorded in care path:  Patient Active Problem List    Diagnosis Date Noted    KRISSY (obstructive sleep apnea) 11/05/2020    History of palpitations 12/10/2019    S/P angioplasty with stent 01/10/2019    Coronary artery disease involving native coronary artery of native heart without angina pectoris 01/10/2019    NSTEMI (non-ST elevated myocardial infarction) ([facility] [address] 12/29/2018    Chest pain     Dyslipidemia      Current Outpatient Medications   Medication Sig Dispense Refill    metoprolol succinate (TOPROL XL) 25 MG extended release tablet Take 1 tablet by mouth daily 90 tablet 5    tamsulosin (FLOMAX) 0.4 MG capsule Take 0.4 mg by mouth      alirocumab (PRALUENT) 75 MG/ML SOPN injection pen Inject 1 mL into the skin every 14 days 1 pen 11    Magnesium Oxide 250 MG TABS Take 1 tablet by mouth daily 30 tablet 2    nitroGLYCERIN (NITROSTAT) 0.4 MG SL tablet up to max of 3 total doses. If no relief after 1 dose, call 911. 25 tablet 0    Multiple Vitamin (MULTIVITAMINS PO) Take 1 tablet by mouth      aspirin 81 MG tablet Take 81 mg by mouth daily       No current facility-administered medications for this visit.       Allergies: Atorvastatin  Past Medical History:   Diagnosis Date    CAD (coronary artery disease)     H/O percutaneous left heart catheterization 12/29/2018    3 vessel CAD, where in Circumflex disease is mild, LAD, moderate & RCA critical with total occlusion    History of echocardiogram 05/07/2019    EF 50-55%, Normal, no significant valvular disease or diastolic dysfunction    History of exercise stress test 01/21/2019    treadmill    Hyperlipidemia     S/P PTCA (percutaneous transluminal coronary angioplasty) 12/29/2018    Successful PCI of RCA & PDA with excellent results     Past Surgical History:   Procedure Laterality Date    CARDIAC CATHETERIZATION  12/29/2018    3 vessel CAD, where in Circumflex disease is mild, LAD, moderate & RCA critical with total occlusion    CORONARY ANGIOPLASTY WITH STENT PLACEMENT  12/29/2018    Successful PCI of RCA & PDA with excellent results    KNEE SURGERY      bilat      As reviewed History reviewed. No pertinent family history. Social History     Tobacco Use    Smoking status: Never Smoker    Smokeless tobacco: Never Used   Substance Use Topics    Alcohol use: No      Review of Systems:    Constitutional: Negative for diaphoresis and fatigue  Psychological:Negative for anxiety or depression  HENT: Negative for headaches, nasal congestion, sinus pain or vertigo  Eyes: Negative for visual disturbance.    Endocrine: Negative for polydipsia/polyuria  Respiratory: Negative for shortness of breath  Cardiovascular: Negative for chest pain, dyspnea on exertion, claudication, edema, irregular heartbeat, murmur, palpitations or shortness of breath  Gastrointestinal: Negative for abdominal pain or heartburn  Genito-Urinary: Negative for urinary frequency/urgency  Musculoskeletal: Negative for muscle pain, muscular weakness, negative for pain in arm and leg or swelling in foot and leg  Neurological: Negative for dizziness, headaches, memory loss, numbness/tingling, visual changes, syncope  Dermatological: Negative for rash    Objective:  /70   Pulse 77   Ht 6' (1.829 m)   Wt 215 lb (97.5 kg)   BMI 29.16 kg/m²   Wt Readings from Last 3 Encounters: 11/05/20 215 lb (97.5 kg)   06/10/20 209 lb 3.2 oz (94.9 kg)   12/10/19 208 lb 9.6 oz (94.6 kg)     Body mass index is 29.16 kg/m². No flowsheet data found. Vitals:    11/05/20 0759   BP: 118/70   Pulse: 77   Weight: 215 lb (97.5 kg)   Height: 6' (1.829 m)      GENERAL - Alert, oriented, pleasant, in no apparent distress. EYES: No jaundice, no conjunctival pallor. SKIN: It is warm & dry. No rashes. No Echhymosis    HEENT - No clinically significant abnormalities seen. Neck - Supple. No jugular venous distention noted. No carotid bruits. Cardiovascular - Normal S1 and S2 without obvious murmur or gallop. Extremities - No cyanosis, clubbing, or significant edema. Pulmonary - No respiratory distress. No wheezes or rales. Abdomen - No masses, tenderness, or organomegaly. Musculoskeletal - No significant edema. No joint deformities. No muscle wasting. Neurologic - Cranial nerves II through XII are grossly intact. There were no gross focal neurologic abnormalities.     Lab Review   Lab Results   Component Value Date    TROPONINT 0.993 12/29/2018    TROPONINT 0.882 12/29/2018     BNP:  No results found for: BNP, PROBNP  PT/INR:  No results found for: INR  No results found for: LABA1C  Lab Results   Component Value Date    WBC 8.1 12/30/2018    WBC 7.7 12/28/2018    HCT 44.3 12/30/2018    HCT 46.0 12/28/2018    MCV 89.9 12/30/2018    MCV 90.9 12/28/2018     12/30/2018     12/28/2018     Lab Results   Component Value Date    CHOL 191 06/05/2020    CHOL 281 (H) 12/29/2018    TRIG 100 06/05/2020    TRIG 124 12/29/2018    HDL 52 06/05/2020    HDL 52 12/29/2018    LDLCALC 119 06/05/2020    LDLDIRECT 233 (H) 12/29/2018     Lab Results   Component Value Date    ALT 23 06/05/2020    ALT 23 12/28/2018    AST 24 06/05/2020    AST 41 (H) 12/28/2018     BMP:    Lab Results   Component Value Date     06/05/2020     04/22/2019    K 4.2 06/05/2020    K 4.5 04/22/2019    CL 97 06/05/2020   no   CARDIOMYOPATHY: None known   CONGESTIVE HEART FAILURE: NO KNOWN HISTORY.     VHD: No significant VHD noted  DYSLIPIDEMIA: Patient's recent profile is not available.                                Tolerating current medical regimen wellyes,                                                             See most recent Lab values in Labs section above. OTHER RELEVANT DIAGNOSIS:as noted in patient's active problem list:  KRISSY: Using C-Pap  TESTS ORDERED:  Stress Cardiolite for pre-op assessment.                                      All previously ordered tests reviewed.   ARRHYTHMIAS: H/O Increased CONRADO .   MEDICATIONS: CPM   Office f/u in Six months. Primary/secondary prevention is the goal by aggressive risk modification, healthy and therapeutic life style changes for cardiovascular risk reduction. Various goals are discussed and multiple questions answered.

## 2020-11-05 NOTE — PATIENT INSTRUCTIONS
CAD:Yes   clinically stable. Patient is on optimal medical regimen ( see medication list above )  - Mamadou Mckoy is currently  asymptomatic from CAD.          - changes in  treatment:   no           - Testing ordered:  no  [place] classification: 1  [place] RISK SCORE:  JAVED RISK SCORE:  HTN:well controlled on current medical regimen, see list above.            - changes in  treatment:   no   CARDIOMYOPATHY: None known   CONGESTIVE HEART FAILURE: NO KNOWN HISTORY.     VHD: No significant VHD noted  DYSLIPIDEMIA: Patient's recent profile is not available.                                Tolerating current medical regimen wellyes,                                                             See most recent Lab values in Labs section above. OTHER RELEVANT DIAGNOSIS:as noted in patient's active problem list:  KRISSY: Using C-Pap  TESTS ORDERED:  Stress Cardiolite for pre-op assessment.                                      All previously ordered tests reviewed.   ARRHYTHMIAS: H/O Increased CONRADO .   MEDICATIONS: CPM   Office f/u in Six months. Primary/secondary prevention is the goal by aggressive risk modification, healthy and therapeutic life style changes for cardiovascular risk reduction. Various goals are discussed and multiple questions answered. Please hold on to these instructions the  will call you within 1-9 business days when we receive authorization from your insurance. Nuclear Stress Test    WHAT TO EXPECT:   ? You will need to confirm the test or it could be cancelled. ? This test will take approximately 2 hours: 1 hour in the AM &    1 hour in the PM. You will be given a time by the Technologist after the first part is completed to come back. ? You will be given a medication, through an IV in the hand, this will safely simulate exercise. This IV is also needed to inject the radioactive isotope unless you are able toe walk the treadmill. ?  You will receive an injection in the AM & PM before the pictures. ? Using a special camera, you will have one set of pictures of your heart taken in the AM and a set of pictures in the PM.     PREPARATION FOR TEST:  ? Eat a light breakfast such as water or juice and toast.  ? If you are DIABETIC: Eat a normal breakfast with NO CAFFEINE and take your insulin as normal.   ? AVOID ALL FOODS & DRINKS containing CAFFEINE 12 HOURS PRIOR TO THE TEST: Including coffee, Tea, Henri and other soft drinks even those labeled  caffeine free or decaffeinated.  ? HOLD THESE MEDICATIONS Persantine & Theophylline (Theodur)  24 hours prior & bring your inhaler with you.    The physician will specify if the following Beta blockers need to be held for 24 hours prior to test: Lopressor (metoprolol)

## 2020-11-05 NOTE — LETTER
[facility]  [address] [id number]  Phone: [phone]  Fax: [phone]    Mckayla Victoria MD        November 5, 2020     Don Carlos DO  [place] [id number]    Patient: Keren Acuña  MR Number: [id number]  YOB: 1955  Date of Visit: 11/5/2020    Dear Dr. Don Carlos: Thank you for the request for consultation for Keren Acuña to me for the evaluation of CAD. Below are the relevant portions of my assessment and plan of care. If you have questions, please do not hesitate to call me. I look forward to following Margaret Henderson along with you.     Sincerely,        Mckayla Victoria MD

## 2020-11-11 ENCOUNTER — PROCEDURE VISIT (OUTPATIENT)
Dept: CARDIOLOGY CLINIC | Age: 65
End: 2020-11-11

## 2020-11-11 LAB
LV EF: 62 %
LVEF MODALITY: NORMAL

## 2020-11-11 PROCEDURE — 93016 CV STRESS TEST SUPVJ ONLY: CPT | Performed by: INTERNAL MEDICINE

## 2020-11-11 PROCEDURE — A9500 TC99M SESTAMIBI: HCPCS | Performed by: INTERNAL MEDICINE

## 2020-11-11 PROCEDURE — 78452 HT MUSCLE IMAGE SPECT MULT: CPT | Performed by: INTERNAL MEDICINE

## 2020-11-11 PROCEDURE — 93018 CV STRESS TEST I&R ONLY: CPT | Performed by: INTERNAL MEDICINE

## 2020-11-11 PROCEDURE — 93017 CV STRESS TEST TRACING ONLY: CPT | Performed by: INTERNAL MEDICINE

## 2020-12-02 ENCOUNTER — TELEPHONE (OUTPATIENT)
Dept: CARDIOLOGY CLINIC | Age: 65
End: 2020-12-02

## 2020-12-02 NOTE — TELEPHONE ENCOUNTER
Cardiologist: Dr. Chivo Garcia  Surgeon: Dr. Yadi Garcia  Surgery: Koki Sepulveda total knee  Anesthesia: Spinal  Date: 12/16/2020  FAX# [phone]  Ph# [phone]    Last OV 11/5/2020 w/Tessie    CAD:Yes   clinically stable. Patient is on optimal medical regimen ( see medication list above )  - Trina Falk is currently  asymptomatic from CAD.          - changes in  treatment:   no           - Testing ordered:  no  [place] classification: 1  [place] RISK SCORE:  JAVED RISK SCORE:  HTN:well controlled on current medical regimen, see list above.            - changes in  treatment:   no   CARDIOMYOPATHY: None known   CONGESTIVE HEART FAILURE: NO KNOWN HISTORY.     VHD: No significant VHD noted  DYSLIPIDEMIA: Patient's recent profile is not available.                                Tolerating current medical regimen wellyes,                                                             See most recent Lab values in Labs section above. OTHER RELEVANT DIAGNOSIS:as noted in patient's active problem list:  KRISSY: Using C-Pap  TESTS ORDERED:  Stress Cardiolite for pre-op assessment.                                      All previously ordered tests reviewed.   ARRHYTHMIAS: H/O Increased CONRADO .   MEDICATIONS: CPM          NM- 11/11/2020   Good exercise capacity. 10 METs work load.     Physiological BP response to exercise.     ETT negative for Ischemia.     Normal perfusion study with normal distribution in all coronal, short, and     horizontal axis.     The observed defect is consistent with diaphragmatic attenuation.     Normal LV function. LVEF is 62 %. Echo-5/7/2019   Left ventricular systolic function is normal with an ejection fraction of   50-55%. No significant valvular disease or diastolic dysfunction noted.    Essentially normal echocardiogram.      CATH- 12/29/2019      Aspirin

## 2020-12-14 ENCOUNTER — TELEPHONE (OUTPATIENT)
Dept: CARDIOLOGY CLINIC | Age: 65
End: 2020-12-14

## 2020-12-14 NOTE — TELEPHONE ENCOUNTER
[facility] pre-admissions ([facility]) requesting resting stress test results faxed to their office fax# [phone] for upcoming procedure.

## 2020-12-16 RX ORDER — METOPROLOL SUCCINATE 25 MG/1
25 TABLET, EXTENDED RELEASE ORAL DAILY
Qty: 90 TABLET | Refills: 3 | OUTPATIENT
Start: 2020-12-16

## 2020-12-19 RX ORDER — METOPROLOL SUCCINATE 25 MG/1
25 TABLET, EXTENDED RELEASE ORAL 2 TIMES DAILY
Qty: 180 TABLET | Refills: 3 | Status: SHIPPED | OUTPATIENT
Start: 2020-12-19 | End: 2021-06-25

## 2021-01-07 ENCOUNTER — TELEPHONE (OUTPATIENT)
Dept: CARDIOLOGY CLINIC | Age: 66
End: 2021-01-07

## 2021-01-07 NOTE — TELEPHONE ENCOUNTER
Patient assistance application emailed to patient. Patient to complete and mail/ email back. Patient advised and voiced understanding.

## 2021-01-19 NOTE — TELEPHONE ENCOUNTER
2021 Patient assistance application complete, scanned into Epic and faxed to [employer]. Awaiting response.

## 2021-02-01 NOTE — TELEPHONE ENCOUNTER
Spoke with eveline negron. Per their guidelines, patient would need to submit a new application as the application that the patient sent was . Rep did advise that regardless of the application, due to the new guidelines, based on the patients income, he would not qualify as his income is over the maximum allowed.

## 2021-02-03 ENCOUNTER — TELEPHONE (OUTPATIENT)
Dept: CARDIOLOGY CLINIC | Age: 66
End: 2021-02-03

## 2021-02-10 NOTE — TELEPHONE ENCOUNTER
Patient states that he has enough Praluent to get him through April. He will be on Medicare as of October. He will call back for samples or medication change if needed when he is close to running out of Praluent.

## 2021-03-31 ENCOUNTER — TELEPHONE (OUTPATIENT)
Dept: CARDIOLOGY CLINIC | Age: 66
End: 2021-03-31

## 2021-03-31 NOTE — TELEPHONE ENCOUNTER
Pt called ,and was told that Karry Dakin is out until Monday. He requested to be put in voice mail, would not say why.  Pt was transferd

## 2021-04-09 ENCOUNTER — TELEPHONE (OUTPATIENT)
Dept: CARDIOLOGY CLINIC | Age: 66
End: 2021-04-09

## 2021-04-09 RX ORDER — EVOLOCUMAB 140 MG/ML
1 INJECTION, SOLUTION SUBCUTANEOUS
COMMUNITY
End: 2021-05-04 | Stop reason: SDUPTHER

## 2021-04-09 NOTE — TELEPHONE ENCOUNTER
Patient has been denied for assistance with Praluent. Per Dr. Noal Frost, ok to change patient to [address] [place] [address] and give samples. Patient does not have insurance until October when Medicare starts. Patient will call the office for samples when needed.

## 2021-05-04 ENCOUNTER — OFFICE VISIT (OUTPATIENT)
Dept: CARDIOLOGY CLINIC | Age: 66
End: 2021-05-04

## 2021-05-04 VITALS
SYSTOLIC BLOOD PRESSURE: 138 MMHG | HEIGHT: 72 IN | DIASTOLIC BLOOD PRESSURE: 82 MMHG | HEART RATE: 68 BPM | WEIGHT: 214.4 LBS | BODY MASS INDEX: 29.04 KG/M2

## 2021-05-04 DIAGNOSIS — I25.10 CORONARY ARTERY DISEASE INVOLVING NATIVE CORONARY ARTERY OF NATIVE HEART WITHOUT ANGINA PECTORIS: Primary | ICD-10-CM

## 2021-05-04 DIAGNOSIS — Z95.820 S/P ANGIOPLASTY WITH STENT: ICD-10-CM

## 2021-05-04 DIAGNOSIS — E78.5 DYSLIPIDEMIA: ICD-10-CM

## 2021-05-04 DIAGNOSIS — G47.33 OSA (OBSTRUCTIVE SLEEP APNEA): ICD-10-CM

## 2021-05-04 DIAGNOSIS — I21.4 NSTEMI (NON-ST ELEVATED MYOCARDIAL INFARCTION) (HCC): ICD-10-CM

## 2021-05-04 DIAGNOSIS — Z87.898 HISTORY OF PALPITATIONS: ICD-10-CM

## 2021-05-04 PROCEDURE — 99213 OFFICE O/P EST LOW 20 MIN: CPT | Performed by: INTERNAL MEDICINE

## 2021-05-04 RX ORDER — EVOLOCUMAB 140 MG/ML
1 INJECTION, SOLUTION SUBCUTANEOUS
Qty: 1 PEN | Refills: 0 | COMMUNITY
Start: 2021-05-04 | End: 2021-06-01 | Stop reason: SDUPTHER

## 2021-05-04 NOTE — PROGRESS NOTES
irregular heartbeat, murmur, palpitations or shortness of breath  Musculoskeletal: Negative for muscle pain, muscular weakness, negative for pain in arm and leg or swelling in foot and leg    Objective:  /82 (Site: Left Upper Arm, Position: Sitting)   Pulse 68   Ht 6' (1.829 m)   Wt 214 lb 6.4 oz (97.3 kg)   BMI 29.08 kg/m²   Wt Readings from Last 3 Encounters:   05/04/21 214 lb 6.4 oz (97.3 kg)   11/05/20 215 lb (97.5 kg)   06/10/20 209 lb 3.2 oz (94.9 kg)     Body mass index is 29.08 kg/m². GENERAL - Alert, oriented, pleasant, in no apparent distress. EYES: No jaundice, no conjunctival pallor. Neck - Supple. No jugular venous distention noted. No carotid bruits. Cardiovascular  Normal S1 and S2 without obvious murmur or gallop. Extremities - No cyanosis, clubbing, or significant edema. Pulmonary  No respiratory distress. No wheezes or rales.       Lab Review   Lab Results   Component Value Date    TROPONINT 0.993 12/29/2018    TROPONINT 0.882 12/29/2018     No results found for: BNP, PROBNP  No results found for: INR  No results found for: LABA1C  Lab Results   Component Value Date    WBC 8.1 12/30/2018    WBC 7.7 12/28/2018    HCT 44.3 12/30/2018    HCT 46.0 12/28/2018    MCV 89.9 12/30/2018    MCV 90.9 12/28/2018     12/30/2018     12/28/2018     Lab Results   Component Value Date    CHOL 191 06/05/2020    CHOL 281 (H) 12/29/2018    TRIG 100 06/05/2020    TRIG 124 12/29/2018    HDL 52 06/05/2020    HDL 52 12/29/2018    LDLCALC 119 06/05/2020    LDLDIRECT 233 (H) 12/29/2018     Lab Results   Component Value Date    ALT 23 06/05/2020    ALT 23 12/28/2018    AST 24 06/05/2020    AST 41 (H) 12/28/2018     BMP:    Lab Results   Component Value Date     06/05/2020     04/22/2019    K 4.2 06/05/2020    K 4.5 04/22/2019    CL 97 06/05/2020     04/22/2019    CO2 29 06/05/2020    CO2 32 04/22/2019    BUN 20 06/05/2020    BUN 28 04/22/2019    CREATININE 1.1 06/05/2020 CREATININE 0.9 04/22/2019     CMP:   Lab Results   Component Value Date     06/05/2020     04/22/2019    K 4.2 06/05/2020    K 4.5 04/22/2019    CL 97 06/05/2020     04/22/2019    CO2 29 06/05/2020    CO2 32 04/22/2019    BUN 20 06/05/2020    BUN 28 04/22/2019    CREATININE 1.1 06/05/2020    CREATININE 0.9 04/22/2019    PROT 6.9 12/28/2018     No results found for: TSH, TSHHS    CARDIOLITE 11/2020    Good exercise capacity. 10 METs work load.    Physiological BP response to exercise.    ETT negative for Ischemia.    Normal perfusion study with normal distribution in all coronal, short, and    horizontal axis.    The observed defect is consistent with diaphragmatic attenuation.    Normal LV function. LVEF is 62 %. ECHO 5/2019   Left ventricular systolic function is normal with an ejection fraction of   50-55%. No significant valvular disease or diastolic dysfunction noted. Essentially normal echocardiogram.    QUALITY MEASURES REVIEWED:  1.CAD:Patient is taking anti platelet agent:Yes  2. DYSLIPIDEMIA: Patient is on cholesterol lowering medication:Yes  3. Beta-Blocker therapy for CAD, if prior Myocardial Infarction:Yes   4. Counselled regarding smoking cessation. No   Patient does not Smoke. 5.Anticoagulation therapy (for A.Fib) No   Does Not have A.Fib.  6.Discussed weight management strategies. Assessment & Plan:    Primary / Secondary prevention is the goal by aggressive risk modification, healthy and therapeutic life style changes for cardiovascular risk reduction. Various goals are discussed and multiple questions answered. CAD:Yes   clinically stable. Patient is on optimal medical regimen ( see medication list above )  -   Patient is currently  asymptomatic from CAD.          - changes in  treatment:   no           - Testing ordered:  no  [place] classification: 1  [place] RISK SCORE:  JAVED RISK SCORE:  HTN:well controlled on current medical regimen, see list above.

## 2021-05-04 NOTE — PATIENT INSTRUCTIONS
CAD:Yes   clinically stable. Patient is on optimal medical regimen ( see medication list above )  -   Patient is currently  asymptomatic from CAD.          - changes in  treatment:   no           - Testing ordered:  no  [place] classification: 1  [place] RISK SCORE:  JAVED RISK SCORE:  HTN:well controlled on current medical regimen, see list above.            - changes in  treatment:   no   CARDIOMYOPATHY: None known   CONGESTIVE HEART FAILURE: NO KNOWN HISTORY.     VHD: No significant VHD noted  DYSLIPIDEMIA: Patient's recent profile is not available.                                Tolerating current medical regimen wellyes,                                                             See most recent Lab values in Labs section above. OTHER RELEVANT DIAGNOSIS:as noted in patient's active problem list:  KRISSY: Patient had stopped using  C-Pap & CONRADO came back so he started using it again. TESTS ORDERED:  none this visit                                      All previously ordered tests reviewed.   ARRHYTHMIAS: H/O Increased CONRADO .   MEDICATIONS: CPM   Office f/u in Six months.

## 2021-05-04 NOTE — LETTER
[facility]  [address] [place] [place] [address] [address]  Phone: [phone]  Fax: [phone]    Marisel Heredia MD        May 4, 2021     [address] [id number]    Patient: Trina Helton  MR Number: [id number]  YOB: 1955  Date of Visit: 5/4/2021    Dear Dr. Joseluis Rain: Thank you for the request for consultation for Trina Helton to me for the evaluation of CAD. Below are the relevant portions of my assessment and plan of care. If you have questions, please do not hesitate to call me. I look forward to following Antoine Spivey along with you.     Sincerely,        Marisel Heredia MD
50 to 55 %. Successful PCI of RCA & PDA with excellent results. Amio Protocol:    CHADS: [id number] Score for Atrial Fibrillation Stroke Risk   Risk   Factors  Component Value   C CHF No 0   H HTN No 0   A2 Age >= 76 No,  (63 y.o.) 0   D DM No 0   S2 Prior Stroke/TIA No 0   V Vascular Disease Yes 1   A Age 74-69 No,  (63 y.o.) 0   Sc Sex male 0    [id number]  Score  1   Score last updated 5/4/21 3:01 AM EDT    Click here for a link to the UpToDate guideline \"Atrial Fibrillation: Anticoagulation therapy to prevent embolization    Disclaimer: Risk Score calculation is dependent on accuracy of patient problem list and past encounter diagnosis.

## 2021-06-01 RX ORDER — EVOLOCUMAB 140 MG/ML
1 INJECTION, SOLUTION SUBCUTANEOUS
Qty: 2 PEN | Refills: 0 | COMMUNITY
Start: 2021-06-01 | End: 2021-07-15 | Stop reason: SDUPTHER

## 2021-06-01 NOTE — TELEPHONE ENCOUNTER
Pt needs samples of  repatha would like 2 pens if poss so does not have to take off work. He is out  Wednesday Ov 11/21  Pt is self pay.

## 2021-06-25 DIAGNOSIS — Z95.820 S/P ANGIOPLASTY WITH STENT: ICD-10-CM

## 2021-06-25 DIAGNOSIS — E78.5 DYSLIPIDEMIA: ICD-10-CM

## 2021-06-25 DIAGNOSIS — I21.4 NSTEMI (NON-ST ELEVATED MYOCARDIAL INFARCTION) (HCC): ICD-10-CM

## 2021-06-25 DIAGNOSIS — R00.2 PALPITATIONS: ICD-10-CM

## 2021-06-25 DIAGNOSIS — I49.3 PVC (PREMATURE VENTRICULAR CONTRACTION): ICD-10-CM

## 2021-06-25 DIAGNOSIS — I25.10 CORONARY ARTERY DISEASE INVOLVING NATIVE CORONARY ARTERY OF NATIVE HEART WITHOUT ANGINA PECTORIS: ICD-10-CM

## 2021-06-25 RX ORDER — METOPROLOL SUCCINATE 25 MG/1
TABLET, EXTENDED RELEASE ORAL
Qty: 90 TABLET | Refills: 3 | Status: SHIPPED | OUTPATIENT
Start: 2021-06-25 | End: 2022-05-12 | Stop reason: SDUPTHER

## 2021-07-15 ENCOUNTER — TELEPHONE (OUTPATIENT)
Dept: CARDIOLOGY CLINIC | Age: 66
End: 2021-07-15

## 2021-07-15 RX ORDER — EVOLOCUMAB 140 MG/ML
1 INJECTION, SOLUTION SUBCUTANEOUS
Qty: 2 PEN | Refills: 0 | COMMUNITY
Start: 2021-07-15 | End: 2021-08-16 | Stop reason: SDUPTHER

## 2021-08-16 RX ORDER — EVOLOCUMAB 140 MG/ML
1 INJECTION, SOLUTION SUBCUTANEOUS
Qty: 2 PEN | Refills: 0 | COMMUNITY
Start: 2021-08-16 | End: 2021-09-15 | Stop reason: SDUPTHER

## 2021-09-15 RX ORDER — EVOLOCUMAB 140 MG/ML
1 INJECTION, SOLUTION SUBCUTANEOUS
Qty: 2 PEN | Refills: 0 | COMMUNITY
Start: 2021-09-15 | End: 2021-10-12 | Stop reason: ALTCHOICE

## 2021-10-04 LAB
ALBUMIN SERPL-MCNC: 4.6 G/DL
ALP BLD-CCNC: 75 U/L
ALT SERPL-CCNC: 24 U/L
ANION GAP SERPL CALCULATED.3IONS-SCNC: NORMAL MMOL/L
AST SERPL-CCNC: 28 U/L
AVERAGE GLUCOSE: NORMAL
BILIRUB SERPL-MCNC: 0.5 MG/DL (ref 0.1–1.4)
BUN BLDV-MCNC: 19 MG/DL
CALCIUM SERPL-MCNC: 9.7 MG/DL
CHLORIDE BLD-SCNC: 99 MMOL/L
CHOLESTEROL, TOTAL: 169 MG/DL
CHOLESTEROL/HDL RATIO: NORMAL
CO2: 30 MMOL/L
CREAT SERPL-MCNC: 1 MG/DL
GFR CALCULATED: NORMAL
GLUCOSE BLD-MCNC: 89 MG/DL
HBA1C MFR BLD: 5.7 %
HDLC SERPL-MCNC: 60 MG/DL (ref 35–70)
LDL CHOLESTEROL CALCULATED: 90 MG/DL (ref 0–160)
NONHDLC SERPL-MCNC: NORMAL MG/DL
POTASSIUM SERPL-SCNC: 4.3 MMOL/L
SODIUM BLD-SCNC: 137 MMOL/L
TOTAL PROTEIN: 6.7
TRIGL SERPL-MCNC: 95 MG/DL
VLDLC SERPL CALC-MCNC: 19 MG/DL

## 2021-10-12 ENCOUNTER — TELEPHONE (OUTPATIENT)
Dept: CARDIOLOGY CLINIC | Age: 66
End: 2021-10-12

## 2021-10-12 NOTE — TELEPHONE ENCOUNTER
Per [facility] [employer], patient is covered through the end of the year without PA. New PA starts 1/1/22 and is good for the year.

## 2021-10-15 ENCOUNTER — TELEPHONE (OUTPATIENT)
Dept: CARDIOLOGY CLINIC | Age: 66
End: 2021-10-15

## 2021-10-19 ENCOUNTER — TELEPHONE (OUTPATIENT)
Dept: CARDIOLOGY CLINIC | Age: 66
End: 2021-10-19

## 2021-10-19 NOTE — TELEPHONE ENCOUNTER
Patient called requesting to speak with Ebony regarding Praluent, please call him back at ph# [phone].

## 2021-10-25 NOTE — TELEPHONE ENCOUNTER
Per patient, RX would cost $490 as he is in the initial coverage phase. Spoke with [facility] who advised that once the deductible has cate met, the patient would then pay $140 for a 90 day supply and $47.00 for a 30 day supply. Will advise patient and also submit Tier exception.

## 2021-10-26 ENCOUNTER — TELEPHONE (OUTPATIENT)
Dept: CARDIOLOGY CLINIC | Age: 66
End: 2021-10-26

## 2021-10-26 NOTE — TELEPHONE ENCOUNTER
Patient would like to continue Repatha until after the first of the year and at that point, he will be able to afford the Praluent on his Medicare Part-D plan. Call to triage for Repatha samples.

## 2021-10-28 NOTE — TELEPHONE ENCOUNTER
Tier exception denied. Patient states that after the first of the year, he will be able to afford Praluent. Patient to continue Nicolette Dill as per discussion with Dr. Leoncio Hewitt and will switch to Praluent 1/1/22.

## 2021-11-09 ENCOUNTER — OFFICE VISIT (OUTPATIENT)
Dept: CARDIOLOGY CLINIC | Age: 66
End: 2021-11-09
Payer: MEDICARE

## 2021-11-09 VITALS
SYSTOLIC BLOOD PRESSURE: 124 MMHG | BODY MASS INDEX: 29.34 KG/M2 | WEIGHT: 216.6 LBS | DIASTOLIC BLOOD PRESSURE: 70 MMHG | HEART RATE: 80 BPM | HEIGHT: 72 IN

## 2021-11-09 DIAGNOSIS — I25.10 CORONARY ARTERY DISEASE INVOLVING NATIVE CORONARY ARTERY OF NATIVE HEART WITHOUT ANGINA PECTORIS: Primary | ICD-10-CM

## 2021-11-09 DIAGNOSIS — Z95.820 S/P ANGIOPLASTY WITH STENT: ICD-10-CM

## 2021-11-09 DIAGNOSIS — Z87.898 HISTORY OF PALPITATIONS: ICD-10-CM

## 2021-11-09 DIAGNOSIS — I21.4 NSTEMI (NON-ST ELEVATED MYOCARDIAL INFARCTION) (HCC): ICD-10-CM

## 2021-11-09 DIAGNOSIS — E78.5 DYSLIPIDEMIA: ICD-10-CM

## 2021-11-09 DIAGNOSIS — G47.33 OSA (OBSTRUCTIVE SLEEP APNEA): ICD-10-CM

## 2021-11-09 PROCEDURE — G8427 DOCREV CUR MEDS BY ELIG CLIN: HCPCS | Performed by: INTERNAL MEDICINE

## 2021-11-09 PROCEDURE — 1036F TOBACCO NON-USER: CPT | Performed by: INTERNAL MEDICINE

## 2021-11-09 PROCEDURE — 4040F PNEUMOC VAC/ADMIN/RCVD: CPT | Performed by: INTERNAL MEDICINE

## 2021-11-09 PROCEDURE — 3017F COLORECTAL CA SCREEN DOC REV: CPT | Performed by: INTERNAL MEDICINE

## 2021-11-09 PROCEDURE — 99212 OFFICE O/P EST SF 10 MIN: CPT | Performed by: INTERNAL MEDICINE

## 2021-11-09 PROCEDURE — G8417 CALC BMI ABV UP PARAM F/U: HCPCS | Performed by: INTERNAL MEDICINE

## 2021-11-09 PROCEDURE — G8484 FLU IMMUNIZE NO ADMIN: HCPCS | Performed by: INTERNAL MEDICINE

## 2021-11-09 PROCEDURE — 1123F ACP DISCUSS/DSCN MKR DOCD: CPT | Performed by: INTERNAL MEDICINE

## 2021-11-09 RX ORDER — METFORMIN HYDROCHLORIDE 500 MG/1
TABLET, EXTENDED RELEASE ORAL
COMMUNITY
Start: 2021-10-13

## 2021-11-09 NOTE — PROGRESS NOTES
OFFICE PROGRESS NOTES      Dana Leo is a 73 y.o. male who has    CHIEF COMPLAINT AS FOLLOWS:  CHEST PAIN: Patient denies any C/O chest pains at this time. SOB: No C/O SOB at this time. LEG EDEMA: No leg edema   PALPITATIONS:  No C/O palpitations. DIZZINESS: No C/O Dizziness   SYNCOPE: None   OTHER:                                     HPI: Patient is here for F/U on his CAD & Dyslipidemia problems. CAD: Patient has known CAD. Had angioplasty in the past.  Dyslipidemia: Patient has known mixed dyslipidemia. Has been treated with guideline recommended medical / physical/ diet therapy as stated below. Current Outpatient Medications   Medication Sig Dispense Refill    metFORMIN (GLUCOPHAGE-XR) 500 MG extended release tablet       Evolocumab 140 MG/ML SOAJ Inject 140 mg into the skin every 14 days 2 pen 0    metoprolol succinate (TOPROL XL) 25 MG extended release tablet TAKE ONE TABLET BY MOUTH DAILY 90 tablet 3    tamsulosin (FLOMAX) 0.4 MG capsule Take 0.4 mg by mouth      Magnesium Oxide 250 MG TABS Take 1 tablet by mouth daily 30 tablet 2    nitroGLYCERIN (NITROSTAT) 0.4 MG SL tablet up to max of 3 total doses. If no relief after 1 dose, call 911. 25 tablet 0    Multiple Vitamin (MULTIVITAMINS PO) Take 1 tablet by mouth      aspirin 81 MG tablet Take 81 mg by mouth daily      alirocumab (PRALUENT) 75 MG/ML SOAJ injection pen Inject 1 mL into the skin every 14 days (Patient not taking: Reported on 10/26/2021) 6 pen 3     No current facility-administered medications for this visit.      Allergies: Atorvastatin  Review of Systems:    Constitutional: Negative for diaphoresis and fatigue  Respiratory: Negative for shortness of breath  Cardiovascular: Negative for chest pain, dyspnea on exertion, claudication, edema, irregular heartbeat, murmur, palpitations or shortness of breath  Musculoskeletal: Negative for muscle pain, muscular weakness, negative for pain in arm and leg or swelling in foot and leg    Objective:  /70   Pulse 80   Ht 6' (1.829 m)   Wt 216 lb 9.6 oz (98.2 kg)   BMI 29.38 kg/m²   Wt Readings from Last 3 Encounters:   11/09/21 216 lb 9.6 oz (98.2 kg)   05/04/21 214 lb 6.4 oz (97.3 kg)   11/05/20 215 lb (97.5 kg)     Body mass index is 29.38 kg/m². GENERAL - Alert, oriented, pleasant, in no apparent distress. EYES: No jaundice, no conjunctival pallor. Neck - Supple. No jugular venous distention noted. No carotid bruits. Cardiovascular - Normal S1 and S2 without obvious murmur or gallop. Extremities - No cyanosis, clubbing, or significant edema. Pulmonary - No respiratory distress. No wheezes or rales.       Lab Review   Lab Results   Component Value Date    TROPONINT 0.993 12/29/2018    TROPONINT 0.882 12/29/2018     No results found for: BNP, PROBNP  No results found for: INR  No results found for: LABA1C  Lab Results   Component Value Date    WBC 8.1 12/30/2018    WBC 7.7 12/28/2018    HCT 44.3 12/30/2018    HCT 46.0 12/28/2018    MCV 89.9 12/30/2018    MCV 90.9 12/28/2018     12/30/2018     12/28/2018     Lab Results   Component Value Date    CHOL 169 10/04/2021    CHOL 191 06/05/2020    TRIG 95 10/04/2021    TRIG 100 06/05/2020    HDL 60 10/04/2021    HDL 52 06/05/2020    LDLCALC 90 10/04/2021    LDLCALC 119 06/05/2020    LDLDIRECT 233 (H) 12/29/2018     Lab Results   Component Value Date    ALT 23 06/05/2020    ALT 23 12/28/2018    AST 24 06/05/2020    AST 41 (H) 12/28/2018     BMP:    Lab Results   Component Value Date     06/05/2020     04/22/2019    K 4.2 06/05/2020    K 4.5 04/22/2019    CL 97 06/05/2020     04/22/2019    CO2 29 06/05/2020    CO2 32 04/22/2019    BUN 20 06/05/2020    BUN 28 04/22/2019    CREATININE 1.1 06/05/2020    CREATININE 0.9 04/22/2019     CMP:   Lab Results   Component Value Date     06/05/2020     04/22/2019    K 4.2 06/05/2020    K 4.5 04/22/2019    CL 97 06/05/2020  04/22/2019    CO2 29 06/05/2020    CO2 32 04/22/2019    BUN 20 06/05/2020    BUN 28 04/22/2019    CREATININE 1.1 06/05/2020    CREATININE 0.9 04/22/2019    PROT 6.9 12/28/2018     No results found for: TSH, TSHHS       CARDIOLITE 11/2020    Good exercise capacity. 10 METs work load.    Physiological BP response to exercise.    ETT negative for Ischemia.    Normal perfusion study with normal distribution in all coronal, short, and    horizontal axis.    The observed defect is consistent with diaphragmatic attenuation.    Normal LV function. LVEF is 62 %.      ECHO 5/2019   Left ventricular systolic function is normal with an ejection fraction of   50-55%.   No significant valvular disease or diastolic dysfunction noted.   Essentially normal echocardiogram.    QUALITY MEASURES REVIEWED:  1.CAD:Patient is taking anti platelet agent:Yes  LIPIDEMIA: Patient is on cholesterol lowering medication:Yes   3. Beta-Blocker therapy for CAD, if prior Myocardial Infarction:Yes   4. Counselled regarding smoking cessation. No   Patient does not Smoke. 5.Anticoagulation therapy (for A.Fib) No   Does Not have A.Fib.  6.Discussed weight management strategies. Assessment & Plan:  Primary / Secondary prevention is the goal by aggressive risk modification, healthy and therapeutic life style changes for cardiovascular risk reduction. CORONARY ARTERY DISEASE:Yes   clinically stable. Patient is on optimal medical regimen ( see medication list above )  -   Patient is currently  asymptomatic from CAD.             - changes in  treatment:   no           - Testing ordered:  no  [place] classification: 1  [place] RISK SCORE:  JAVED RISK SCORE:  HTN:well controlled on current medical regimen, see list above.            - changes in  treatment:   no   CARDIOMYOPATHY: None known   CONGESTIVE HEART FAILURE: NO KNOWN HISTORY.     VHD: No significant VHD noted  DYSLIPIDEMIA: Patient's recent profile is not available.                                Tolerating current medical regimen wellyes,                                                             See most recent Lab values in Labs section above. OTHER RELEVANT DIAGNOSIS:as noted in patient's active problem list:  KRISSY: Patient had stopped using  C-Pap & CONRADO came back so he started using it again.   ARRHYTHMIAS: H/O Increased CONRADO .    TESTS ORDERED: none this visit     PREVIOUSLY ORDERED TESTS REVIEWED & DISCUSSED WITH THE PATIENT:     I personally reviewed & interpreted, all previously ordered tests as copied above. Latest Labs are pulled in to the note with dates. Labs, specially in Reference to Lipid profile, Cardiac testing in the form of Echo, stress tests & other relevant cardiac testing reviewed with patient & recommendations made based on assessment of the results. MEDICATIONS: List of medications patient is currently taking is reviewed in detail with the patient & family member present. Discussed any side effects or problems taking the medication. Recommend Continue present management & medications as listed. AFFIRMATION: I spent at least 20 minutes of time reviewing patient's history, previous & current medical problems & all Labs + testing. This includes chart prep even prior to the vosit. Various goals are discussed and multiple questions answered. Relevant concelling performed. Office follow up in six months.

## 2021-11-09 NOTE — LETTER
[facility]  [address] [address]  Phone: [phone]  Fax: [phone]    Antonio Serra MD    November 9, 2021     Bairon Melendrez PA-C  [address] [id number]    Patient: Kermit Zhong   MR Number: [id number]   YOB: 1955   Date of Visit: 11/9/2021       Dear Jovani Cornelius Nourse: Thank you for referring Kermit Zhong to me for evaluation/treatment. Below are the relevant portions of my assessment and plan of care. If you have questions, please do not hesitate to call me. I look forward to following Daniel Erazo along with you.     Sincerely,      Antonio Serra MD

## 2021-11-09 NOTE — PATIENT INSTRUCTIONS
CORONARY ARTERY DISEASE:Yes   clinically stable. Patient is on optimal medical regimen ( see medication list above )  -   Patient is currently  asymptomatic from CAD.          - changes in  treatment:   no           - Testing ordered:  no  [place] classification: 1  [place] RISK SCORE:  JAVED RISK SCORE:  HTN:well controlled on current medical regimen, see list above.            - changes in  treatment:   no   CARDIOMYOPATHY: None known   CONGESTIVE HEART FAILURE: NO KNOWN HISTORY.     VHD: No significant VHD noted  DYSLIPIDEMIA: Patient's recent profile is not available.                                Tolerating current medical regimen wellyes,                                                             See most recent Lab values in Labs section above. OTHER RELEVANT DIAGNOSIS:as noted in patient's active problem list:  KRISSY: Patient had stopped using  C-Pap & CONRADO came back so he started using it again.   ARRHYTHMIAS: H/O Increased CONRADO .    TESTS ORDERED: none this visit     PREVIOUSLY ORDERED TESTS REVIEWED & DISCUSSED WITH THE PATIENT:     I personally reviewed & interpreted, all previously ordered tests as copied above. Latest Labs are pulled in to the note with dates. Labs, specially in Reference to Lipid profile, Cardiac testing in the form of Echo, stress tests & other relevant cardiac testing reviewed with patient & recommendations made based on assessment of the results. MEDICATIONS: List of medications patient is currently taking is reviewed in detail with the patient & family member present. Discussed any side effects or problems taking the medication. Recommend Continue present management & medications as listed. AFFIRMATION: I spent at least 20 minutes of time reviewing patient's history, previous & current medical problems & all Labs + testing. This includes chart prep even prior to the vosit. Various goals are discussed and multiple questions answered. Relevant concelling performed. Office follow up in six months.

## 2021-11-22 ENCOUNTER — TELEPHONE (OUTPATIENT)
Dept: CARDIOLOGY CLINIC | Age: 66
End: 2021-11-22

## 2022-01-06 ENCOUNTER — TELEPHONE (OUTPATIENT)
Dept: CARDIOLOGY CLINIC | Age: 67
End: 2022-01-06

## 2022-01-06 NOTE — TELEPHONE ENCOUNTER
Applied for a carrie with [employer]. [place] are only open for patients already enrolled. Will apply once open enrollment begins. Will advise patient.

## 2022-05-12 ENCOUNTER — OFFICE VISIT (OUTPATIENT)
Dept: CARDIOLOGY CLINIC | Age: 67
End: 2022-05-12
Payer: MEDICARE

## 2022-05-12 VITALS
SYSTOLIC BLOOD PRESSURE: 122 MMHG | BODY MASS INDEX: 29.5 KG/M2 | WEIGHT: 217.8 LBS | DIASTOLIC BLOOD PRESSURE: 78 MMHG | HEART RATE: 59 BPM | HEIGHT: 72 IN

## 2022-05-12 DIAGNOSIS — I25.10 CORONARY ARTERY DISEASE INVOLVING NATIVE CORONARY ARTERY OF NATIVE HEART WITHOUT ANGINA PECTORIS: Primary | ICD-10-CM

## 2022-05-12 DIAGNOSIS — Z95.820 S/P ANGIOPLASTY WITH STENT: ICD-10-CM

## 2022-05-12 DIAGNOSIS — I49.3 PVC (PREMATURE VENTRICULAR CONTRACTION): ICD-10-CM

## 2022-05-12 DIAGNOSIS — G47.33 OSA (OBSTRUCTIVE SLEEP APNEA): ICD-10-CM

## 2022-05-12 DIAGNOSIS — R00.2 PALPITATIONS: ICD-10-CM

## 2022-05-12 DIAGNOSIS — I21.4 NSTEMI (NON-ST ELEVATED MYOCARDIAL INFARCTION) (HCC): ICD-10-CM

## 2022-05-12 DIAGNOSIS — E78.5 DYSLIPIDEMIA: ICD-10-CM

## 2022-05-12 PROCEDURE — 1036F TOBACCO NON-USER: CPT | Performed by: INTERNAL MEDICINE

## 2022-05-12 PROCEDURE — 4040F PNEUMOC VAC/ADMIN/RCVD: CPT | Performed by: INTERNAL MEDICINE

## 2022-05-12 PROCEDURE — G8427 DOCREV CUR MEDS BY ELIG CLIN: HCPCS | Performed by: INTERNAL MEDICINE

## 2022-05-12 PROCEDURE — 1123F ACP DISCUSS/DSCN MKR DOCD: CPT | Performed by: INTERNAL MEDICINE

## 2022-05-12 PROCEDURE — G8417 CALC BMI ABV UP PARAM F/U: HCPCS | Performed by: INTERNAL MEDICINE

## 2022-05-12 PROCEDURE — 3017F COLORECTAL CA SCREEN DOC REV: CPT | Performed by: INTERNAL MEDICINE

## 2022-05-12 PROCEDURE — 99213 OFFICE O/P EST LOW 20 MIN: CPT | Performed by: INTERNAL MEDICINE

## 2022-05-12 RX ORDER — METOPROLOL SUCCINATE 25 MG/1
25 TABLET, EXTENDED RELEASE ORAL DAILY
Qty: 90 TABLET | Refills: 0 | Status: SHIPPED | OUTPATIENT
Start: 2022-05-12 | End: 2022-06-29

## 2022-05-12 RX ORDER — NITROGLYCERIN 0.4 MG/1
0.4 TABLET SUBLINGUAL EVERY 5 MIN PRN
Qty: 25 TABLET | Refills: 3 | Status: SHIPPED | OUTPATIENT
Start: 2022-05-12

## 2022-05-12 NOTE — PROGRESS NOTES
OFFICE PROGRESS NOTES      Sandra Moore is a 73 y.o. male who has    CHIEF COMPLAINT AS FOLLOWS:  CHEST PAIN: Patient denies any C/O chest pains at this time.      SOB: No C/O SOB at this time.                 LEG EDEMA: No leg edema   PALPITATIONS:  No C/O palpitations.   DIZZINESS: No C/O Dizziness   SYNCOPE: None   OTHER/ ADDITIONAL COMPLAINTS:                                     HPI: Patient is here for F/U on his CAD,  & Dyslipidemia problems. CAD: Patient has known CAD. Had angioplasty  in the past.  Dyslipidemia: Patient has known mixed dyslipidemia. Has been treated with guideline recommended medical / physical/ diet therapy as stated below. Current Outpatient Medications   Medication Sig Dispense Refill    metoprolol succinate (TOPROL XL) 25 MG extended release tablet Take 1 tablet by mouth daily 90 tablet 0    Evolocumab 140 MG/ML SOAJ Inject 140 mg into the skin every 14 days 2 pen 0    metFORMIN (GLUCOPHAGE-XR) 500 MG extended release tablet       Magnesium Oxide 250 MG TABS Take 1 tablet by mouth daily 30 tablet 2    nitroGLYCERIN (NITROSTAT) 0.4 MG SL tablet up to max of 3 total doses. If no relief after 1 dose, call 911. 25 tablet 0    Multiple Vitamin (MULTIVITAMINS PO) Take 1 tablet by mouth      aspirin 81 MG tablet Take 81 mg by mouth daily      tamsulosin (FLOMAX) 0.4 MG capsule Take 0.4 mg by mouth       No current facility-administered medications for this visit.      Allergies: Atorvastatin  Review of Systems:    Constitutional: Negative for diaphoresis and fatigue  Respiratory: Negative for shortness of breath  Cardiovascular: Negative for chest pain, dyspnea on exertion, claudication, edema, irregular heartbeat, murmur, palpitations or shortness of breath  Musculoskeletal: Negative for muscle pain, muscular weakness, negative for pain in arm and leg or swelling in foot and leg    Objective:  /78   Pulse 59   Ht 6' (1.829 m)   Wt 217 lb 12.8 oz (98.8 kg)   BMI 29.54 kg/m²   Wt Readings from Last 3 Encounters:   05/12/22 217 lb 12.8 oz (98.8 kg)   11/09/21 216 lb 9.6 oz (98.2 kg)   05/04/21 214 lb 6.4 oz (97.3 kg)     Body mass index is 29.54 kg/m². GENERAL - Alert, oriented, pleasant, in no apparent distress. EYES: No jaundice, no conjunctival pallor. Neck - Supple. No jugular venous distention noted. No carotid bruits. Cardiovascular  Normal S1 and S2 without obvious murmur or gallop. Extremities - No cyanosis, clubbing, or significant edema. Pulmonary  No respiratory distress. No wheezes or rales.       MEDICAL DECISION MAKING & DATA REVIEW:    Lab Review   Lab Results   Component Value Date    TROPONINT 0.993 12/29/2018    TROPONINT 0.882 12/29/2018     No results found for: BNP, PROBNP  No results found for: INR  Lab Results   Component Value Date    LABA1C 5.7 10/04/2021     Lab Results   Component Value Date    WBC 8.1 12/30/2018    WBC 7.7 12/28/2018    HCT 44.3 12/30/2018    HCT 46.0 12/28/2018    MCV 89.9 12/30/2018    MCV 90.9 12/28/2018     12/30/2018     12/28/2018     Lab Results   Component Value Date    CHOL 169 10/04/2021    CHOL 191 06/05/2020    TRIG 95 10/04/2021    TRIG 100 06/05/2020    HDL 60 10/04/2021    HDL 52 06/05/2020    LDLCALC 90 10/04/2021    LDLCALC 119 06/05/2020    LDLDIRECT 233 (H) 12/29/2018     Lab Results   Component Value Date    ALT 24 10/04/2021    ALT 23 06/05/2020    AST 28 10/04/2021    AST 24 06/05/2020     BMP:    Lab Results   Component Value Date     10/04/2021     06/05/2020    K 4.3 10/04/2021    K 4.2 06/05/2020    CL 99 10/04/2021    CL 97 06/05/2020    CO2 30 10/04/2021    CO2 29 06/05/2020    BUN 19 10/04/2021    BUN 20 06/05/2020    CREATININE 1.0 10/04/2021    CREATININE 1.1 06/05/2020     CMP:   Lab Results   Component Value Date     10/04/2021     06/05/2020    K 4.3 10/04/2021    K 4.2 06/05/2020    CL 99 10/04/2021    CL 97 06/05/2020    CO2 30 10/04/2021    CO2 29 06/05/2020    BUN 19 10/04/2021    BUN 20 06/05/2020    CREATININE 1.0 10/04/2021    CREATININE 1.1 06/05/2020    PROT 6.9 12/28/2018     No results found for: TSH, TSHHS    QUALITY MEASURES REVIEWED:  1.CAD:Patient is taking anti platelet agent:Yes  2. DYSLIPIDEMIA: Patient is on cholesterol lowering medication:Yes   3. Beta-Blocker therapy for CAD, if prior Myocardial Infarction:Yes   4. Counselled regarding smoking cessation. No   Patient does not Smoke. 5.Anticoagulation therapy (for A.Fib) No   Does Not have A.Fib.  6.Discussed weight management strategies. Assessment & Plan:  Primary / Secondary prevention is the goal by aggressive risk modification, healthy and therapeutic life style changes for cardiovascular risk reduction. CORONARY ARTERY DISEASE:Yes   clinically stable. Patient is on optimal medical regimen ( see medication list above )  -   Patient is currently  asymptomatic from CAD.          - changes in  treatment:   no, on ASA           - Testing ordered:  no  Mc[place]od classification: 1    CARDIOLITE 11/2020    Good exercise capacity. 10 METs work load.    Physiological BP response to exercise.    ETT negative for Ischemia.    Normal perfusion study with normal distribution in all coronal, short, and    horizontal axis.    The observed defect is consistent with diaphragmatic attenuation.    Normal LV function. LVEF is 62 %.      HTN:well controlled on current medical regimen, see list above.            - changes in  treatment:   no, on Toprol XL  ECHO 5/2019   Left ventricular systolic function is normal with an ejection fraction of   50-55%.   No significant valvular disease or diastolic dysfunction noted.   Essentially normal echocardiogram.     CARDIOMYOPATHY: None known   CONGESTIVE HEART FAILURE: NO KNOWN HISTORY.     VHD: No significant VHD noted  DYSLIPIDEMIA: Patient's recent profile is not available.                                Tolerating current medical regimen wellyes,  takes Repatha.                                                          See most recent Lab values in Labs section above. OTHER RELEVANT DIAGNOSIS:as noted in patient's active problem list:  Fahad Organ had stopped using  C-Pap & CONRADO came back so he started using it again.   ARRHYTHMIAS: H/O Increased CONRADO .    TESTS ORDERED:     PREVIOUSLY ORDERED TESTS REVIEWED & DISCUSSED WITH THE PATIENT:     I personally reviewed & interpreted, all previously ordered tests as copied above. Latest Labs are pulled in to the note with dates. Labs, specially in Reference to Lipid profile, Cardiac testing in the form of Echo ( dated: ), stress tests ( dated: ) & other relevant cardiac testing reviewed with patient & recommendations made based on assessment of the results. Discussed role of Cardiac risk factors & effects + treatment of co morbidities with patient & advised accordingly. MEDICATIONS: List of medications patient is currently taking is reviewed in detail with the patient. Discussed any side effects or problems taking the medication. Recommend Continue present management & medications as listed. AFFIRMATION: I spent at least 20 minutes of time reviewing patient's history, previous & current medical problems & all Labs + testing. This includes chart prep even prior to the vosit. Various goals are discussed and multiple questions answered. Relevant concelling performed. Office follow up in six months.

## 2022-05-12 NOTE — PATIENT INSTRUCTIONS
**It is YOUR responsibilty to bring medication bottles and/or updated medication list to [address] [place] [address]. This will allow us to better serve you and all your healthcare needs**    Please be informed that if you contact our office outside of normal business hours the physician on call cannot help with any scheduling or rescheduling issues, procedure instruction questions or any type of medication issue. We advise you for any urgent/emergency that you go to the nearest emergency room! PLEASE CALL OUR OFFICE DURING NORMAL BUSINESS HOURS    Monday - Friday   8 am to 5 pm    [place]: [phone]    [place]: [phone]    [place]:  [phone]  CORONARY ARTERY DISEASE:Yes   clinically stable. Patient is on optimal medical regimen ( see medication list above )  -   Patient is currently  asymptomatic from CAD.          - changes in  treatment:   no, on ASA           - Testing ordered:  no  Somali classification: 1    CARDIOLITE 11/2020    Good exercise capacity. 10 METs work load.    Physiological BP response to exercise.    ETT negative for Ischemia.    Normal perfusion study with normal distribution in all coronal, short, and    horizontal axis.    The observed defect is consistent with diaphragmatic attenuation.    Normal LV function. LVEF is 62 %.      HTN:well controlled on current medical regimen, see list above.            - changes in  treatment:   no, on Toprol XL  ECHO 5/2019   Left ventricular systolic function is normal with an ejection fraction of   50-55%.   No significant valvular disease or diastolic dysfunction noted.   Essentially normal echocardiogram.     CARDIOMYOPATHY: None known   CONGESTIVE HEART FAILURE: NO KNOWN HISTORY.     VHD: No significant VHD noted  DYSLIPIDEMIA: Patient's recent profile is not available.                                Tolerating current medical regimen wellyes,  takes Repatha.                                                          See most recent Lab values in Labs section above. OTHER RELEVANT DIAGNOSIS:as noted in patient's active problem list:  Susan Max had stopped using  C-Pap & CONRADO came back so he started using it again.   ARRHYTHMIAS: H/O Increased CONRADO .    TESTS ORDERED:     PREVIOUSLY ORDERED TESTS REVIEWED & DISCUSSED WITH THE PATIENT:     I personally reviewed & interpreted, all previously ordered tests as copied above. Latest Labs are pulled in to the note with dates. Labs, specially in Reference to Lipid profile, Cardiac testing in the form of Echo ( dated: ), stress tests ( dated: ) & other relevant cardiac testing reviewed with patient & recommendations made based on assessment of the results. Discussed role of Cardiac risk factors & effects + treatment of co morbidities with patient & advised accordingly. MEDICATIONS: List of medications patient is currently taking is reviewed in detail with the patient. Discussed any side effects or problems taking the medication. Recommend Continue present management & medications as listed. AFFIRMATION: I spent at least 20 minutes of time reviewing patient's history, previous & current medical problems & all Labs + testing. This includes chart prep even prior to the vosit. Various goals are discussed and multiple questions answered. Relevant concelling performed. Office follow up in six months.

## 2022-06-29 DIAGNOSIS — I49.3 PVC (PREMATURE VENTRICULAR CONTRACTION): ICD-10-CM

## 2022-06-29 DIAGNOSIS — E78.5 DYSLIPIDEMIA: ICD-10-CM

## 2022-06-29 DIAGNOSIS — I25.10 CORONARY ARTERY DISEASE INVOLVING NATIVE CORONARY ARTERY OF NATIVE HEART WITHOUT ANGINA PECTORIS: ICD-10-CM

## 2022-06-29 DIAGNOSIS — Z95.820 S/P ANGIOPLASTY WITH STENT: ICD-10-CM

## 2022-06-29 DIAGNOSIS — I21.4 NSTEMI (NON-ST ELEVATED MYOCARDIAL INFARCTION) (HCC): ICD-10-CM

## 2022-06-29 DIAGNOSIS — R00.2 PALPITATIONS: ICD-10-CM

## 2022-06-30 RX ORDER — METOPROLOL SUCCINATE 25 MG/1
25 TABLET, EXTENDED RELEASE ORAL DAILY
Qty: 90 TABLET | Refills: 3 | Status: SHIPPED | OUTPATIENT
Start: 2022-06-30

## 2022-08-02 ENCOUNTER — TELEPHONE (OUTPATIENT)
Dept: CARDIOLOGY CLINIC | Age: 67
End: 2022-08-02

## 2022-08-03 ENCOUNTER — OFFICE VISIT (OUTPATIENT)
Dept: CARDIOLOGY CLINIC | Age: 67
End: 2022-08-03
Payer: MEDICARE

## 2022-08-03 ENCOUNTER — PROCEDURE VISIT (OUTPATIENT)
Dept: CARDIOLOGY CLINIC | Age: 67
End: 2022-08-03
Payer: MEDICARE

## 2022-08-03 VITALS — WEIGHT: 217 LBS | HEIGHT: 72 IN | HEART RATE: 76 BPM | BODY MASS INDEX: 29.39 KG/M2

## 2022-08-03 DIAGNOSIS — Z95.820 S/P ANGIOPLASTY WITH STENT: ICD-10-CM

## 2022-08-03 DIAGNOSIS — I20.9 ANGINA PECTORIS (HCC): ICD-10-CM

## 2022-08-03 DIAGNOSIS — G47.33 OSA (OBSTRUCTIVE SLEEP APNEA): ICD-10-CM

## 2022-08-03 DIAGNOSIS — E78.5 DYSLIPIDEMIA: ICD-10-CM

## 2022-08-03 DIAGNOSIS — I25.10 CORONARY ARTERY DISEASE INVOLVING NATIVE CORONARY ARTERY OF NATIVE HEART WITHOUT ANGINA PECTORIS: Primary | ICD-10-CM

## 2022-08-03 DIAGNOSIS — I21.4 NSTEMI (NON-ST ELEVATED MYOCARDIAL INFARCTION) (HCC): ICD-10-CM

## 2022-08-03 LAB
LV EF: 62 %
LVEF MODALITY: NORMAL

## 2022-08-03 PROCEDURE — 78452 HT MUSCLE IMAGE SPECT MULT: CPT | Performed by: INTERNAL MEDICINE

## 2022-08-03 PROCEDURE — 93018 CV STRESS TEST I&R ONLY: CPT | Performed by: INTERNAL MEDICINE

## 2022-08-03 PROCEDURE — G8427 DOCREV CUR MEDS BY ELIG CLIN: HCPCS | Performed by: INTERNAL MEDICINE

## 2022-08-03 PROCEDURE — G8417 CALC BMI ABV UP PARAM F/U: HCPCS | Performed by: INTERNAL MEDICINE

## 2022-08-03 PROCEDURE — 93016 CV STRESS TEST SUPVJ ONLY: CPT | Performed by: INTERNAL MEDICINE

## 2022-08-03 PROCEDURE — 1123F ACP DISCUSS/DSCN MKR DOCD: CPT | Performed by: INTERNAL MEDICINE

## 2022-08-03 PROCEDURE — 93000 ELECTROCARDIOGRAM COMPLETE: CPT | Performed by: INTERNAL MEDICINE

## 2022-08-03 PROCEDURE — 1036F TOBACCO NON-USER: CPT | Performed by: INTERNAL MEDICINE

## 2022-08-03 PROCEDURE — 93017 CV STRESS TEST TRACING ONLY: CPT | Performed by: INTERNAL MEDICINE

## 2022-08-03 PROCEDURE — 99214 OFFICE O/P EST MOD 30 MIN: CPT | Performed by: INTERNAL MEDICINE

## 2022-08-03 PROCEDURE — 3017F COLORECTAL CA SCREEN DOC REV: CPT | Performed by: INTERNAL MEDICINE

## 2022-08-03 PROCEDURE — A9500 TC99M SESTAMIBI: HCPCS | Performed by: INTERNAL MEDICINE

## 2022-08-03 NOTE — PROGRESS NOTES
Vein \"LEG PROBLEM Questionnaire\"  Do you have prominent leg veins? No   Do you have any skin discoloration? No  Do you have any healed or active sores? No  Do you have swelling of the legs? No  Do you have a family history of varicose veins? Yes  Does your profession involve pro-longed        standing or heavy lifting? No  7. Have you been fighting overweight problems? Yes  8. Do you have restless legs? No  9. Do you have any night time cramps? No  10. Do you have any of the following in your legs:         I  11. If Yes - Have they worn compression stockings No  12.  If they have worn compression stockings

## 2022-08-03 NOTE — PROGRESS NOTES
OFFICE PROGRESS NOTES      Geri Hameed is a 73 y.o. male who has    CHIEF COMPLAINT AS FOLLOWS:  CHEST PAIN: Patient C/O chest pain suggestive of Cardiac ischemia. When did it began: yesterday  How long does it last: lasted for about 2 hours till he took NTG. How severe:2/10  Radiation:no  Aggravating factors:Physical exertion  Relieving factors: SL NTG. Associated features:no  Tenderness to palpation: no     SOB: No C/O SOB at this time. LEG EDEMA: No leg edema   PALPITATIONS:  No C/O palpitations. DIZZINESS: No C/O Dizziness                         SYNCOPE: None   OTHER/ ADDITIONAL COMPLAINTS:                                     HPI: Patient is here for F/U on his CAD & Dyslipidemia problems. CAD: Patient has known CAD. Had angioplasty in the past.  Dyslipidemia: Patient has known mixed dyslipidemia. Has been treated with guideline recommended medical / physical/ diet therapy as stated below. Current Outpatient Medications   Medication Sig Dispense Refill    metoprolol succinate (TOPROL XL) 25 MG extended release tablet Take 1 tablet by mouth daily 90 tablet 3    nitroGLYCERIN (NITROSTAT) 0.4 MG SL tablet Place 1 tablet under the tongue every 5 minutes as needed for Chest pain up to max of 3 total doses. If no relief after 1 dose, call 911. 25 tablet 3    Evolocumab 140 MG/ML SOAJ Inject 140 mg into the skin every 14 days 2 pen 0    metFORMIN (GLUCOPHAGE-XR) 500 MG extended release tablet       tamsulosin (FLOMAX) 0.4 MG capsule Take 0.4 mg by mouth      Magnesium Oxide 250 MG TABS Take 1 tablet by mouth daily 30 tablet 2    nitroGLYCERIN (NITROSTAT) 0.4 MG SL tablet up to max of 3 total doses. If no relief after 1 dose, call 911. (Patient taking differently: as needed up to max of 3 total doses.  If no relief after 1 dose, call 911.) 25 tablet 0    Multiple Vitamin (MULTIVITAMINS PO) Take 1 tablet by mouth      aspirin 81 MG tablet Take 81 mg by mouth daily       No current facility-administered medications for this visit. Allergies: Atorvastatin  Review of Systems:    Constitutional: Negative for diaphoresis and fatigue  Respiratory: Negative for shortness of breath  Cardiovascular: Negative for chest pain, dyspnea on exertion, claudication, edema, irregular heartbeat, murmur, palpitations or shortness of breath  Musculoskeletal: Negative for muscle pain, muscular weakness, negative for pain in arm and leg or swelling in foot and leg    Objective:  Pulse 76   Ht 6' (1.829 m)   Wt 217 lb (98.4 kg)   BMI 29.43 kg/m²  114/74 mm of Hg. Wt Readings from Last 3 Encounters:   08/03/22 217 lb (98.4 kg)   05/12/22 217 lb 12.8 oz (98.8 kg)   11/09/21 216 lb 9.6 oz (98.2 kg)     Body mass index is 29.43 kg/m². GENERAL - Alert, oriented, pleasant, in no apparent distress. EYES: No jaundice, no conjunctival pallor. Neck - Supple. No jugular venous distention noted. No carotid bruits. Cardiovascular - Normal S1 and S2 without obvious murmur or gallop. Extremities - No cyanosis, clubbing, or significant edema. Pulmonary - No respiratory distress. No wheezes or rales.       MEDICAL DECISION MAKING & DATA REVIEW:    Lab Review   Lab Results   Component Value Date/Time    TROPONINT 0.993 12/29/2018 08:25 AM    TROPONINT 0.882 12/29/2018 02:22 AM     No results found for: BNP, PROBNP  No results found for: INR  Lab Results   Component Value Date    LABA1C 5.7 10/04/2021     Lab Results   Component Value Date    WBC 8.1 12/30/2018    WBC 7.7 12/28/2018    HCT 44.3 12/30/2018    HCT 46.0 12/28/2018    MCV 89.9 12/30/2018    MCV 90.9 12/28/2018     12/30/2018     12/28/2018     Lab Results   Component Value Date    CHOL 169 10/04/2021    CHOL 191 06/05/2020    TRIG 95 10/04/2021    TRIG 100 06/05/2020    HDL 60 10/04/2021    HDL 52 06/05/2020    LDLCALC 90 10/04/2021    LDLCALC 119 06/05/2020    LDLDIRECT 233 (H) 12/29/2018     Lab Results   Component Value Date    ALT 24 10/04/2021    ALT 23 06/05/2020    AST 28 10/04/2021    AST 24 06/05/2020     BMP:    Lab Results   Component Value Date/Time     10/04/2021 12:00 AM     06/05/2020 12:00 AM    K 4.3 10/04/2021 12:00 AM    K 4.2 06/05/2020 12:00 AM    CL 99 10/04/2021 12:00 AM    CL 97 06/05/2020 12:00 AM    CO2 30 10/04/2021 12:00 AM    CO2 29 06/05/2020 12:00 AM    BUN 19 10/04/2021 12:00 AM    BUN 20 06/05/2020 12:00 AM    CREATININE 1.0 10/04/2021 12:00 AM    CREATININE 1.1 06/05/2020 12:00 AM     CMP:   Lab Results   Component Value Date/Time     10/04/2021 12:00 AM     06/05/2020 12:00 AM    K 4.3 10/04/2021 12:00 AM    K 4.2 06/05/2020 12:00 AM    CL 99 10/04/2021 12:00 AM    CL 97 06/05/2020 12:00 AM    CO2 30 10/04/2021 12:00 AM    CO2 29 06/05/2020 12:00 AM    BUN 19 10/04/2021 12:00 AM    BUN 20 06/05/2020 12:00 AM    CREATININE 1.0 10/04/2021 12:00 AM    CREATININE 1.1 06/05/2020 12:00 AM    PROT 6.9 12/28/2018 10:22 PM     No results found for: TSH, TSHHS    QUALITY MEASURES REVIEWED:  1.CAD:Patient is taking anti platelet agent:Yes  2. DYSLIPIDEMIA: Patient is on cholesterol lowering medication:Yes   3. Beta-Blocker therapy for CAD, if prior Myocardial Infarction:Yes   4. Counselled regarding smoking cessation. No   Patient does not Smoke. 5.Anticoagulation therapy (for A.Fib) No   Does Not have A.Fib.  6.Discussed weight management strategies. Assessment & Plan:  Primary / Secondary prevention is the goal by aggressive risk modification, healthy and therapeutic life style changes for cardiovascular risk reduction. CORONARY ARTERY DISEASE:Yes   clinically stable. Patient is on optimal medical regimen ( see medication list above )  -   Patient is having recurrence of symptoms from CAD. - changes in  treatment:   no, on ASA           - Testing ordered:  no  [facility] classification: 1     CARDIOLITE 11/2020    Good exercise capacity. 10 METs work load.     Physiological BP response to exercise. ETT negative for Ischemia. Normal perfusion study with normal distribution in all coronal, short, and    horizontal axis. The observed defect is consistent with diaphragmatic attenuation. Normal LV function. LVEF is 62 %. EKG: Bigeminal rhythm 76/min    HTN:well controlled on current medical regimen, see list above. - changes in  treatment:   no, on Toprol XL  ECHO 5/2019   Left ventricular systolic function is normal with an ejection fraction of   50-55%. No significant valvular disease or diastolic dysfunction noted. Essentially normal echocardiogram.      CARDIOMYOPATHY: None known   CONGESTIVE HEART FAILURE: NO KNOWN HISTORY.     VHD: No significant VHD noted  DYSLIPIDEMIA: Patient's recent profile is not available. Tolerating current medical regimen wellyes,  takes Repatha. See most recent Lab values in Labs section above. KRISSY: Patient had stopped using  C-Pap & CONRADO came back so he started using it again. ARRHYTHMIAS: H/O Increased CONRADO . TESTS ORDERED: Stress Cardiolite     PREVIOUSLY ORDERED TESTS REVIEWED & DISCUSSED WITH THE PATIENT:     I personally reviewed & interpreted, all previously ordered tests as copied above. Latest Labs are pulled in to the note with dates. Labs, specially in Reference to Lipid profile, Cardiac testing in the form of Echo ( dated: ), stress tests ( dated: ) & other relevant cardiac testing reviewed with patient & recommendations made based on assessment of the results. Discussed role of Cardiac risk factors & effects + treatment of co morbidities with patient & advised accordingly. MEDICATIONS: List of medications patient is currently taking is reviewed in detail with the patient & family member present. Discussed any side effects or problems taking the medication. Recommend Continue present management & medications as listed. AFFIRMATION: I reviewed patient's history, previous & current medical problems & all Labs + testing. This includes chart prep even prior to the vosit. Various goals are discussed and multiple questions answered. Relevant concelling performed. Office follow up for test results.

## 2022-08-03 NOTE — PATIENT INSTRUCTIONS
Please be informed that if you contact our office outside of normal business hours the physician on call cannot help with any scheduling or rescheduling issues, procedure instruction questions or any type of medication issue. We advise you for any urgent/emergency that you go to the nearest emergency room! PLEASE CALL OUR OFFICE DURING NORMAL BUSINESS HOURS    Monday - Friday   8 am to 5 pm    [place]: [phone]    [place]:  [phone]  **It is YOUR responsibilty to bring medication bottles and/or updated medication list to [address] [place] [address]. This will allow us to better serve you and all your healthcare needs**  [facility] Locations - No appointment necessary. Sites open Monday to Friday. Call your preferred location for test preparation, business hours and other information you need. [employer] accepts [employer]. [facility] [address] [facility], [address] [facility]  Phone: [phone] [facility] [address] [facility]  [address]. [place], [address] [facility]  Phone: [phone]   CORONARY ARTERY DISEASE:Yes   clinically stable. Patient is on optimal medical regimen ( see medication list above )  -   Patient is having recurrence of symptoms from CAD. - changes in  treatment:   no, on ASA           - Testing ordered:  no  [facility] classification: 1     CARDIOLITE 11/2020    Good exercise capacity. 10 METs work load. Physiological BP response to exercise. ETT negative for Ischemia. Normal perfusion study with normal distribution in all coronal, short, and    horizontal axis. The observed defect is consistent with diaphragmatic attenuation. Normal LV function. LVEF is 62 %. EKG: Bigeminal rhythm 76/min    HTN:well controlled on current medical regimen, see list above.             - changes in  treatment:   no, on Toprol XL  ECHO 5/2019   Left ventricular systolic function is normal with an ejection fraction of 50-55%. No significant valvular disease or diastolic dysfunction noted. Essentially normal echocardiogram.      CARDIOMYOPATHY: None known   CONGESTIVE HEART FAILURE: NO KNOWN HISTORY.     VHD: No significant VHD noted  DYSLIPIDEMIA: Patient's recent profile is not available. Tolerating current medical regimen wellyes,  takes Repatha. See most recent Lab values in Labs section above. KRISSY: Patient had stopped using  C-Pap & CONRADO came back so he started using it again. ARRHYTHMIAS: H/O Increased CONRADO . TESTS ORDERED: Stress Cardiolite     PREVIOUSLY ORDERED TESTS REVIEWED & DISCUSSED WITH THE PATIENT:     I personally reviewed & interpreted, all previously ordered tests as copied above. Latest Labs are pulled in to the note with dates. Labs, specially in Reference to Lipid profile, Cardiac testing in the form of Echo ( dated: ), stress tests ( dated: ) & other relevant cardiac testing reviewed with patient & recommendations made based on assessment of the results. Discussed role of Cardiac risk factors & effects + treatment of co morbidities with patient & advised accordingly. MEDICATIONS: List of medications patient is currently taking is reviewed in detail with the patient & family member present. Discussed any side effects or problems taking the medication. Recommend Continue present management & medications as listed. AFFIRMATION: I reviewed patient's history, previous & current medical problems & all Labs + testing. This includes chart prep even prior to the vosit. Various goals are discussed and multiple questions answered. Relevant concelling performed. Office follow up for test results.

## 2022-08-03 NOTE — LETTER
[facility]  [address] [address]  Phone: [phone]  Fax: [phone]    Cherry Vazquez MD    August 3, 2022     [address]    Patient: Vitaly Schultz   MR Number: [id number]   YOB: 1955   Date of Visit: 8/3/2022       Dear Ollie Rogel Nourse: Thank you for referring Vitaly Schultz to me for evaluation/treatment. Below are the relevant portions of my assessment and plan of care. If you have questions, please do not hesitate to call me. I look forward to following Semaj Selby along with you.     Sincerely,      Cherry Vazquez MD

## 2022-08-08 ENCOUNTER — TELEPHONE (OUTPATIENT)
Dept: CARDIOLOGY CLINIC | Age: 67
End: 2022-08-08

## 2022-08-08 NOTE — TELEPHONE ENCOUNTER
Nm:8/3/22  Normal study. Good exercise capacity. 10 METs work load. Physiological BP response to exercise. ETT negative for Ischemia / Arrhythmia. Normal perfusion study with normal distribution in all coronal, short, and    horizontal axis. The observed defect is consistent with diaphragmatic attenuation. Normal LV function. LVEF is 62 %. Supervising physician Dr. Eric Lee .      Patient verbally understood

## 2022-08-11 ENCOUNTER — OFFICE VISIT (OUTPATIENT)
Dept: CARDIOLOGY CLINIC | Age: 67
End: 2022-08-11
Payer: MEDICARE

## 2022-08-11 VITALS
SYSTOLIC BLOOD PRESSURE: 112 MMHG | DIASTOLIC BLOOD PRESSURE: 64 MMHG | WEIGHT: 219 LBS | HEIGHT: 72 IN | HEART RATE: 72 BPM | BODY MASS INDEX: 29.66 KG/M2

## 2022-08-11 DIAGNOSIS — I25.10 CORONARY ARTERY DISEASE INVOLVING NATIVE CORONARY ARTERY OF NATIVE HEART WITHOUT ANGINA PECTORIS: Primary | ICD-10-CM

## 2022-08-11 DIAGNOSIS — I21.4 NSTEMI (NON-ST ELEVATED MYOCARDIAL INFARCTION) (HCC): ICD-10-CM

## 2022-08-11 DIAGNOSIS — G47.33 OSA (OBSTRUCTIVE SLEEP APNEA): ICD-10-CM

## 2022-08-11 DIAGNOSIS — Z95.820 S/P ANGIOPLASTY WITH STENT: ICD-10-CM

## 2022-08-11 DIAGNOSIS — E78.5 DYSLIPIDEMIA: ICD-10-CM

## 2022-08-11 PROCEDURE — 99213 OFFICE O/P EST LOW 20 MIN: CPT | Performed by: INTERNAL MEDICINE

## 2022-08-11 PROCEDURE — 1123F ACP DISCUSS/DSCN MKR DOCD: CPT | Performed by: INTERNAL MEDICINE

## 2022-08-11 PROCEDURE — G8417 CALC BMI ABV UP PARAM F/U: HCPCS | Performed by: INTERNAL MEDICINE

## 2022-08-11 PROCEDURE — 3017F COLORECTAL CA SCREEN DOC REV: CPT | Performed by: INTERNAL MEDICINE

## 2022-08-11 PROCEDURE — 1036F TOBACCO NON-USER: CPT | Performed by: INTERNAL MEDICINE

## 2022-08-11 PROCEDURE — G8427 DOCREV CUR MEDS BY ELIG CLIN: HCPCS | Performed by: INTERNAL MEDICINE

## 2022-08-11 NOTE — LETTER
[facility]  [address] [address]  Phone: [phone]  Fax: [phone]    Itzel Roche MD    August 11, 2022     [address]    Patient: Jojo Tirado   MR Number: [id number]   YOB: 1955   Date of Visit: 8/11/2022       Dear Tayla De Nourse: Thank you for referring Jojo Tirado to me for evaluation/treatment. Below are the relevant portions of my assessment and plan of care. If you have questions, please do not hesitate to call me. I look forward to following Svetlana Santizo along with you.     Sincerely,      Itzel Roche MD

## 2022-08-11 NOTE — LETTER
2022  3:45 PM    Patient Name: Ivett Bynum  : 1955  MRN# [id number]    REASON FOR VISIT: 1 Week follow up, Nuc Stress test results    Patient Active Problem List    Diagnosis Date Noted    KRISSY (obstructive sleep apnea) 2020    History of palpitations 12/10/2019    S/P angioplasty with stent 01/10/2019    Coronary artery disease involving native coronary artery of native heart without angina pectoris 01/10/2019    NSTEMI (non-ST elevated myocardial infarction) ([facility] [address] 2018    Chest pain     Dyslipidemia        Allergies: Atorvastatin      Current Outpatient Medications   Medication Sig Dispense Refill    metoprolol succinate (TOPROL XL) 25 MG extended release tablet Take 1 tablet by mouth daily 90 tablet 3    nitroGLYCERIN (NITROSTAT) 0.4 MG SL tablet Place 1 tablet under the tongue every 5 minutes as needed for Chest pain up to max of 3 total doses. If no relief after 1 dose, call 911. 25 tablet 3    Evolocumab 140 MG/ML SOAJ Inject 140 mg into the skin every 14 days 2 pen 0    metFORMIN (GLUCOPHAGE-XR) 500 MG extended release tablet       tamsulosin (FLOMAX) 0.4 MG capsule Take 0.4 mg by mouth      Magnesium Oxide 250 MG TABS Take 1 tablet by mouth daily 30 tablet 2    nitroGLYCERIN (NITROSTAT) 0.4 MG SL tablet up to max of 3 total doses. If no relief after 1 dose, call 911. (Patient taking differently: as needed up to max of 3 total doses. If no relief after 1 dose, call 911.) 25 tablet 0    Multiple Vitamin (MULTIVITAMINS PO) Take 1 tablet by mouth      aspirin 81 MG tablet Take 81 mg by mouth daily       No current facility-administered medications for this visit.          Lab Review   Lab Results   Component Value Date/Time    TROPONINT 0.993 2018 08:25 AM    TROPONINT 0.882 2018 02:22 AM     BNP:  No results found for: BNP, PROBNP  PT/INR:  No results found for: INR  Lab Results   Component Value Date    LABA1C 5.7 10/04/2021     Lab Results   Component Value Date    WBC 8.1 2018    WBC 7.7 2018    HCT 44.3 2018    HCT 46.0 2018    MCV 89.9 2018    MCV 90.9 2018     2018     2018     Lab Results   Component Value Date    CHOL 169 10/04/2021    CHOL 191 2020    TRIG 95 10/04/2021    TRIG 100 2020    HDL 60 10/04/2021    HDL 52 2020    LDLCALC 90 10/04/2021    LDLCALC 119 2020    LDLDIRECT 233 (H) 2018     Lab Results   Component Value Date    ALT 24 10/04/2021    ALT 23 2020    AST 28 10/04/2021    AST 24 2020     BMP:    Lab Results   Component Value Date/Time     10/04/2021 12:00 AM     2020 12:00 AM    K 4.3 10/04/2021 12:00 AM    K 4.2 2020 12:00 AM    CL 99 10/04/2021 12:00 AM    CL 97 2020 12:00 AM    CO2 30 10/04/2021 12:00 AM    CO2 29 2020 12:00 AM    BUN 19 10/04/2021 12:00 AM    BUN 20 2020 12:00 AM    CREATININE 1.0 10/04/2021 12:00 AM    CREATININE 1.1 2020 12:00 AM     CMP:   Lab Results   Component Value Date/Time     10/04/2021 12:00 AM     2020 12:00 AM    K 4.3 10/04/2021 12:00 AM    K 4.2 2020 12:00 AM    CL 99 10/04/2021 12:00 AM    CL 97 2020 12:00 AM    CO2 30 10/04/2021 12:00 AM    CO2 29 2020 12:00 AM    BUN 19 10/04/2021 12:00 AM    BUN 20 2020 12:00 AM    CREATININE 1.0 10/04/2021 12:00 AM    CREATININE 1.1 2020 12:00 AM    PROT 6.9 2018 10:22 PM     TSH:  No results found for: TSH, TSHHS    Smoke: What:                           How much:    Alcohol: How Much:     Caffeine: Pop:         Tea:            Coffee:                Chocolate:    Exercise:    Last Visit:  Complaints:  Changes:    LAST EK2022    VENOUS DOPPLER: NONE    HOLTER/ EVENT MONITOR: 3/2019    STRESS TEST:  2022  Normal study.    Good exercise capacity.  10 METs work load.    Physiological BP response to exercise.    ETT negative

## 2022-08-11 NOTE — PROGRESS NOTES
OFFICE PROGRESS NOTES      Jose Sparks is a 73 y.o. male who has    CHIEF COMPLAINT AS FOLLOWS:  CHEST PAIN: Patient denies any C/O chest pains at this time. SOB: No C/O SOB at this time. LEG EDEMA: No leg edema   PALPITATIONS:  No C/O palpitations. DIZZINESS: No C/O Dizziness                         SYNCOPE: None   OTHER/ ADDITIONAL COMPLAINTS:                                     HPI: Patient is here for F/U on his CAD & Dyslipidemia problems. CAD: Patient has known CAD. Had angioplasty in the past.  Dyslipidemia: Patient has known mixed dyslipidemia. Has been treated with guideline recommended medical / physical/ diet therapy as stated below. Current Outpatient Medications   Medication Sig Dispense Refill    metoprolol succinate (TOPROL XL) 25 MG extended release tablet Take 1 tablet by mouth daily 90 tablet 3    nitroGLYCERIN (NITROSTAT) 0.4 MG SL tablet Place 1 tablet under the tongue every 5 minutes as needed for Chest pain up to max of 3 total doses. If no relief after 1 dose, call 911. 25 tablet 3    Evolocumab 140 MG/ML SOAJ Inject 140 mg into the skin every 14 days 2 pen 0    metFORMIN (GLUCOPHAGE-XR) 500 MG extended release tablet       tamsulosin (FLOMAX) 0.4 MG capsule Take 0.4 mg by mouth      Magnesium Oxide 250 MG TABS Take 1 tablet by mouth daily 30 tablet 2    nitroGLYCERIN (NITROSTAT) 0.4 MG SL tablet up to max of 3 total doses. If no relief after 1 dose, call 911. (Patient taking differently: as needed up to max of 3 total doses. If no relief after 1 dose, call 911.) 25 tablet 0    Multiple Vitamin (MULTIVITAMINS PO) Take 1 tablet by mouth      aspirin 81 MG tablet Take 81 mg by mouth daily       No current facility-administered medications for this visit.      Allergies: Atorvastatin  Review of Systems:    Constitutional: Negative for diaphoresis and fatigue  Respiratory: Negative for shortness of breath  Cardiovascular: Negative for chest pain, dyspnea on exertion, claudication, edema, irregular heartbeat, murmur, palpitations or shortness of breath  Musculoskeletal: Negative for muscle pain, muscular weakness, negative for pain in arm and leg or swelling in foot and leg    Objective:  /64   Pulse 72   Ht 6' (1.829 m)   Wt 219 lb (99.3 kg)   BMI 29.70 kg/m²   Wt Readings from Last 3 Encounters:   08/11/22 219 lb (99.3 kg)   08/03/22 217 lb (98.4 kg)   05/12/22 217 lb 12.8 oz (98.8 kg)     Body mass index is 29.7 kg/m². GENERAL - Alert, oriented, pleasant, in no apparent distress. EYES: No jaundice, no conjunctival pallor. Neck - Supple. No jugular venous distention noted. No carotid bruits. Cardiovascular - Normal S1 and S2 without obvious murmur or gallop. Extremities - No cyanosis, clubbing, or significant edema. Pulmonary - No respiratory distress. No wheezes or rales.       MEDICAL DECISION MAKING & DATA REVIEW:    Lab Review   Lab Results   Component Value Date/Time    TROPONINT 0.993 12/29/2018 08:25 AM    TROPONINT 0.882 12/29/2018 02:22 AM     No results found for: BNP, PROBNP  No results found for: INR  Lab Results   Component Value Date    LABA1C 5.7 10/04/2021     Lab Results   Component Value Date    WBC 8.1 12/30/2018    WBC 7.7 12/28/2018    HCT 44.3 12/30/2018    HCT 46.0 12/28/2018    MCV 89.9 12/30/2018    MCV 90.9 12/28/2018     12/30/2018     12/28/2018     Lab Results   Component Value Date    CHOL 169 10/04/2021    CHOL 191 06/05/2020    TRIG 95 10/04/2021    TRIG 100 06/05/2020    HDL 60 10/04/2021    HDL 52 06/05/2020    LDLCALC 90 10/04/2021    LDLCALC 119 06/05/2020    LDLDIRECT 233 (H) 12/29/2018     Lab Results   Component Value Date    ALT 24 10/04/2021    ALT 23 06/05/2020    AST 28 10/04/2021    AST 24 06/05/2020     BMP:    Lab Results   Component Value Date/Time     10/04/2021 12:00 AM     06/05/2020 12:00 AM    K 4.3 10/04/2021 12:00 AM    K 4.2 06/05/2020 Normal LV function. LVEF is 62 %. HTN:well controlled on current medical regimen, see list above. - changes in  treatment:   no, on Toprol XL  ECHO 5/2019   Left ventricular systolic function is normal with an ejection fraction of   50-55%. No significant valvular disease or diastolic dysfunction noted. Essentially normal echocardiogram.      CARDIOMYOPATHY: None known   CONGESTIVE HEART FAILURE: NO KNOWN HISTORY.     VHD: No significant VHD noted  DYSLIPIDEMIA: Patient's recent profile is not available. Tolerating current medical regimen wellyes,  takes Repatha. See most recent Lab values in Labs section above. KRISSY: Patient had stopped using  C-Pap & CONRADO came back so he started using it again. ARRHYTHMIAS: H/O Increased CONRADO . TESTS ORDERED:none this visit     PREVIOUSLY ORDERED TESTS REVIEWED & DISCUSSED WITH THE PATIENT:     I personally reviewed & interpreted, all previously ordered tests as copied above. Latest Labs are pulled in to the note with dates. Labs, specially in Reference to Lipid profile, Cardiac testing in the form of Echo ( dated: ), stress tests ( dated: ) & other relevant cardiac testing reviewed with patient & recommendations made based on assessment of the results. Discussed role of Cardiac risk factors & effects + treatment of co morbidities with patient & advised accordingly. MEDICATIONS: List of medications patient is currently taking is reviewed in detail with the patient. Discussed any side effects or problems taking the medication. Recommend Continue present management & medications as listed. AFFIRMATION: I spent at least 20 minutes of time reviewing patient's history, previous & current medical problems & all Labs + testing. This includes chart prep even prior to the vosit. Various goals are discussed and multiple questions answered. Relevant concelling performed. Office follow up in six months.

## 2022-08-11 NOTE — PATIENT INSTRUCTIONS
Please be informed that if you contact our office outside of normal business hours the physician on call cannot help with any scheduling or rescheduling issues, procedure instruction questions or any type of medication issue. We advise you for any urgent/emergency that you go to the nearest emergency room! PLEASE CALL OUR OFFICE DURING NORMAL BUSINESS HOURS    Monday - Friday   8 am to 5 pm    [place]: [phone]    [place]:  [phone]  **It is YOUR responsibilty to bring medication bottles and/or updated medication list to [address] [place] [address]. This will allow us to better serve you and all your healthcare needs**  [facility] Locations - No appointment necessary. Sites open Monday to Friday. Call your preferred location for test preparation, business hours and other information you need. [employer] accepts [employer]. [facility]   [facility] [address] [place], [address] [facility]  Phone: [phone] [facility] [address] [facility]  [address]. [place] sh[place], [address] [facility]  Phone: [phone]     CORONARY ARTERY DISEASE:Yes   clinically stable. Patient is on optimal medical regimen ( see medication list above )  -   Patient is having recurrence of symptoms from CAD. - changes in  treatment:   no, on ASA           - Testing ordered:  no  [facility] classification: 1  8/3/2022    Normal study. Good exercise capacity. 10 METs work load. Physiological BP response to exercise. ETT negative for Ischemia / Arrhythmia. Normal perfusion study with normal distribution in all coronal, short, and    horizontal axis. The observed defect is consistent with diaphragmatic attenuation. Normal LV function. LVEF is 62 %. HTN:well controlled on current medical regimen, see list above. - changes in  treatment:   no, on Toprol XL  ECHO 5/2019   Left ventricular systolic function is normal with an ejection fraction of   50-55%.    No significant valvular disease or diastolic dysfunction noted. Essentially normal echocardiogram.      CARDIOMYOPATHY: None known   CONGESTIVE HEART FAILURE: NO KNOWN HISTORY.     VHD: No significant VHD noted  DYSLIPIDEMIA: Patient's recent profile is not available. Tolerating current medical regimen wellyes,  takes Repatha. See most recent Lab values in Labs section above. KRISSY: Patient had stopped using  C-Pap & CONRADO came back so he started using it again. ARRHYTHMIAS: H/O Increased CONRADO . TESTS ORDERED:none this visit     PREVIOUSLY ORDERED TESTS REVIEWED & DISCUSSED WITH THE PATIENT:     I personally reviewed & interpreted, all previously ordered tests as copied above. Latest Labs are pulled in to the note with dates. Labs, specially in Reference to Lipid profile, Cardiac testing in the form of Echo ( dated: ), stress tests ( dated: ) & other relevant cardiac testing reviewed with patient & recommendations made based on assessment of the results. Discussed role of Cardiac risk factors & effects + treatment of co morbidities with patient & advised accordingly. MEDICATIONS: List of medications patient is currently taking is reviewed in detail with the patient. Discussed any side effects or problems taking the medication. Recommend Continue present management & medications as listed. AFFIRMATION: I spent at least 20 minutes of time reviewing patient's history, previous & current medical problems & all Labs + testing. This includes chart prep even prior to the vosit. Various goals are discussed and multiple questions answered. Relevant concelling performed. Office follow up in six months.

## 2022-11-09 LAB
ALBUMIN SERPL-MCNC: 4.3 G/DL
ALP BLD-CCNC: 79 U/L
ALT SERPL-CCNC: 26 U/L
ANION GAP SERPL CALCULATED.3IONS-SCNC: NORMAL MMOL/L
AST SERPL-CCNC: 29 U/L
AVERAGE GLUCOSE: NORMAL
BASOPHILS ABSOLUTE: 0.1 /ΜL
BASOPHILS RELATIVE PERCENT: 1 %
BILIRUB SERPL-MCNC: 0.4 MG/DL (ref 0.1–1.4)
BUN BLDV-MCNC: 22 MG/DL
CALCIUM SERPL-MCNC: 9.7 MG/DL
CHLORIDE BLD-SCNC: 102 MMOL/L
CHOLESTEROL, TOTAL: 164 MG/DL
CHOLESTEROL/HDL RATIO: NORMAL
CO2: 26 MMOL/L
CREAT SERPL-MCNC: 1.14 MG/DL
EGFR: NORMAL
EOSINOPHILS ABSOLUTE: 0.2 /ΜL
EOSINOPHILS RELATIVE PERCENT: 4 %
GLUCOSE BLD-MCNC: 98 MG/DL
HBA1C MFR BLD: 5.7 %
HCT VFR BLD CALC: 43.5 % (ref 41–53)
HDLC SERPL-MCNC: 47 MG/DL (ref 35–70)
HEMOGLOBIN: 14.7 G/DL (ref 13.5–17.5)
LDL CHOLESTEROL CALCULATED: 102 MG/DL (ref 0–160)
LYMPHOCYTES ABSOLUTE: 2.1 /ΜL
LYMPHOCYTES RELATIVE PERCENT: 40 %
MCH RBC QN AUTO: 29.3 PG
MCHC RBC AUTO-ENTMCNC: 33.8 G/DL
MCV RBC AUTO: 87 FL
MONOCYTES ABSOLUTE: 0.5 /ΜL
MONOCYTES RELATIVE PERCENT: 9 %
NEUTROPHILS ABSOLUTE: 2.4 /ΜL
NEUTROPHILS RELATIVE PERCENT: 46 %
NONHDLC SERPL-MCNC: NORMAL MG/DL
PLATELET # BLD: 294 K/ΜL
PMV BLD AUTO: NORMAL FL
POTASSIUM SERPL-SCNC: 4.3 MMOL/L
RBC # BLD: 5.01 10^6/ΜL
SODIUM BLD-SCNC: 140 MMOL/L
TOTAL PROTEIN: 6.5
TRIGL SERPL-MCNC: 82 MG/DL
VLDLC SERPL CALC-MCNC: 15 MG/DL
WBC # BLD: 5.3 10^3/ML

## 2022-12-01 ENCOUNTER — TELEPHONE (OUTPATIENT)
Dept: CARDIOLOGY CLINIC | Age: 67
End: 2022-12-01

## 2022-12-05 NOTE — TELEPHONE ENCOUNTER
Attempted to call patient x2. Phone rings once and picks but the line is dead and there is nobody on the other end. Will try again 12/6/22. Need to verify if patients wants us to renew his carrie with the [employer].

## 2022-12-05 NOTE — TELEPHONE ENCOUNTER
Patient has received a renewal for his carrie for the [employer] for [address] [place] [address]. Patient advised and voiced understanding. No further needs at this time. Approval scanned to chart.

## 2023-01-25 NOTE — TELEPHONE ENCOUNTER
Patient called requesting a refill on Praluent 75 mg Qty 6.  This is not listed on his medication list. Please review and let patient know if this has been filled

## 2023-01-26 NOTE — TELEPHONE ENCOUNTER
Patient is currently taking Praluent and has been for >1 year. He was on Repatha temporarily due to an insurance issue.  RX sent to Dr. Horacio Dove for approval.

## 2023-02-08 ENCOUNTER — TELEPHONE (OUTPATIENT)
Dept: CARDIOLOGY CLINIC | Age: 68
End: 2023-02-08

## 2023-02-08 NOTE — TELEPHONE ENCOUNTER
Prior Authorization approval received for [facility] for dates 1/9/23-until further notice. Scanned into Epic.

## 2023-02-20 ENCOUNTER — TELEPHONE (OUTPATIENT)
Dept: CARDIOLOGY CLINIC | Age: 68
End: 2023-02-20

## 2023-02-22 ENCOUNTER — OFFICE VISIT (OUTPATIENT)
Dept: CARDIOLOGY CLINIC | Age: 68
End: 2023-02-22
Payer: MEDICARE

## 2023-02-22 VITALS
HEIGHT: 72 IN | DIASTOLIC BLOOD PRESSURE: 74 MMHG | BODY MASS INDEX: 29.69 KG/M2 | WEIGHT: 219.2 LBS | HEART RATE: 64 BPM | SYSTOLIC BLOOD PRESSURE: 126 MMHG

## 2023-02-22 DIAGNOSIS — E78.5 DYSLIPIDEMIA: ICD-10-CM

## 2023-02-22 DIAGNOSIS — G47.33 OSA (OBSTRUCTIVE SLEEP APNEA): ICD-10-CM

## 2023-02-22 DIAGNOSIS — I25.10 CORONARY ARTERY DISEASE INVOLVING NATIVE CORONARY ARTERY OF NATIVE HEART WITHOUT ANGINA PECTORIS: Primary | ICD-10-CM

## 2023-02-22 DIAGNOSIS — I21.4 NSTEMI (NON-ST ELEVATED MYOCARDIAL INFARCTION) (HCC): ICD-10-CM

## 2023-02-22 DIAGNOSIS — Z95.820 S/P ANGIOPLASTY WITH STENT: ICD-10-CM

## 2023-02-22 PROCEDURE — G8417 CALC BMI ABV UP PARAM F/U: HCPCS | Performed by: INTERNAL MEDICINE

## 2023-02-22 PROCEDURE — 99213 OFFICE O/P EST LOW 20 MIN: CPT | Performed by: INTERNAL MEDICINE

## 2023-02-22 PROCEDURE — 1036F TOBACCO NON-USER: CPT | Performed by: INTERNAL MEDICINE

## 2023-02-22 PROCEDURE — G8484 FLU IMMUNIZE NO ADMIN: HCPCS | Performed by: INTERNAL MEDICINE

## 2023-02-22 PROCEDURE — 1123F ACP DISCUSS/DSCN MKR DOCD: CPT | Performed by: INTERNAL MEDICINE

## 2023-02-22 PROCEDURE — 3017F COLORECTAL CA SCREEN DOC REV: CPT | Performed by: INTERNAL MEDICINE

## 2023-02-22 PROCEDURE — G8427 DOCREV CUR MEDS BY ELIG CLIN: HCPCS | Performed by: INTERNAL MEDICINE

## 2023-02-22 NOTE — LETTER
[facility]  [address] [address]  Phone: [phone]  Fax: [phone]    Nereida Morton MD    February 22, 2023     Brianne Soliman [address] [place] [address] [place] [address]    Patient: Geraldo Ormond   MR Number: [id number]   YOB: 1955   Date of Visit: 2/22/2023       Dear Elli Calles Nourse: Thank you for referring Geraldo Ormond to me for evaluation/treatment. Below are the relevant portions of my assessment and plan of care. If you have questions, please do not hesitate to call me. I look forward to following Alisha Chan along with you.     Sincerely,      Nereida Morton MD

## 2023-02-22 NOTE — LETTER
Patient Name: Claudia Saunders  : 1955  MRN# [id number]    REASON FOR VISIT: 6 month follow up   Patient Active Problem List    Diagnosis Date Noted    KRISSY (obstructive sleep apnea) 2020    History of palpitations 12/10/2019    S/P angioplasty with stent 01/10/2019    Coronary artery disease involving native coronary artery of native heart without angina pectoris 01/10/2019    NSTEMI (non-ST elevated myocardial infarction) ([facility] [address] 2018    Chest pain     Dyslipidemia        CURRENT SX:   Chest Pain    Shortness of Breath    Dizziness    Palpitations     Edema    Do you Exercise? ? No family history on file. Social History     Tobacco Use    Smoking status: Never    Smokeless tobacco: Never   Substance Use Topics    Alcohol use: No     Comment: Caffiene - 1-2 cups of decaf coffee/day     Current Outpatient Medications   Medication Sig Dispense Refill    alirocumab (PRALUENT) 75 MG/ML SOAJ injection pen Inject 1 mL into the skin every 14 days 6 Adjustable Dose Pre-filled Pen Syringe 4    metoprolol succinate (TOPROL XL) 25 MG extended release tablet Take 1 tablet by mouth daily 90 tablet 3    nitroGLYCERIN (NITROSTAT) 0.4 MG SL tablet Place 1 tablet under the tongue every 5 minutes as needed for Chest pain up to max of 3 total doses. If no relief after 1 dose, call 911. 25 tablet 3    metFORMIN (GLUCOPHAGE-XR) 500 MG extended release tablet       tamsulosin (FLOMAX) 0.4 MG capsule Take 0.4 mg by mouth      Magnesium Oxide 250 MG TABS Take 1 tablet by mouth daily 30 tablet 2    nitroGLYCERIN (NITROSTAT) 0.4 MG SL tablet up to max of 3 total doses. If no relief after 1 dose, call 911. (Patient taking differently: as needed up to max of 3 total doses. If no relief after 1 dose, call 911.) 25 tablet 0    Multiple Vitamin (MULTIVITAMINS PO) Take 1 tablet by mouth      aspirin 81 MG tablet Take 81 mg by mouth daily       No current facility-administered medications for this visit. LABS:  Lab Results   Component Value Date    CHOL 169 10/04/2021    TRIG 95 10/04/2021    HDL 60 10/04/2021    LDLCALC 90 10/04/2021    LDLDIRECT 233 (H) 12/29/2018        STRESS TEST:  8/2022  Normal study.    Good exercise capacity. 10 METs work load.    Physiological BP response to exercise.    ETT negative for Ischemia / Arrhythmia.    Normal perfusion study with normal distribution in all coronal, short, and    horizontal axis.    The observed defect is consistent with diaphragmatic attenuation.    Normal LV function. LVEF is 62 %.    Supervising physician Dr. Grace Ohara . ECHO: 5/2019   Left ventricular systolic function is normal with an ejection fraction of   50-55%. No significant valvular disease or diastolic dysfunction noted.    Essentially normal echocardiogram.  CAROTID: NONE    MUGA: NONE    LAST PACER CHECK: NONE    CARDIAC CATH: 12/2018    ANTIARRHYTHMIC MEDS: NONE NEEDS EKG    CHADS: [id number] Score for Atrial Fibrillation Stroke Risk   Risk   Factors  Component Value   C CHF No 0   H HTN No 0   A2 Age >= 75 No,  (69 y.o.) 0   D DM No 0   S2 Prior Stroke/TIA No 0   V Vascular Disease Yes 1   A Age 74-69 Yes,  (69 y.o.) 1   Sc Sex male 0    [id number]  Score  2   Score last updated 2/21/23 2:46 PM EST

## 2023-02-22 NOTE — PROGRESS NOTES
OFFICE PROGRESS NOTES      Helena Espinal is a 78 y.o. male who has    CHIEF COMPLAINT AS FOLLOWS:  CHEST PAIN:  Patient denies any C/O chest pains at this time. SOB: No C/O SOB at this time. LEG EDEMA: No leg edema   PALPITATIONS:  No C/O palpitations. DIZZINESS: No C/O Dizziness                         SYNCOPE: None   OTHER/ ADDITIONAL COMPLAINTS:                                     HPI: Patient is here for F/U on his CAD  & Dyslipidemia problems. CAD: Patient has known CAD. Had angioplasty  in the past.  Dyslipidemia: Patient has known mixed dyslipidemia. Has been treated with guideline recommended medical / physical/ diet therapy as stated below. Current Outpatient Medications   Medication Sig Dispense Refill    alirocumab (PRALUENT) 75 MG/ML SOAJ injection pen Inject 1 mL into the skin every 14 days 6 Adjustable Dose Pre-filled Pen Syringe 4    metoprolol succinate (TOPROL XL) 25 MG extended release tablet Take 1 tablet by mouth daily 90 tablet 3    nitroGLYCERIN (NITROSTAT) 0.4 MG SL tablet Place 1 tablet under the tongue every 5 minutes as needed for Chest pain up to max of 3 total doses. If no relief after 1 dose, call 911. 25 tablet 3    metFORMIN (GLUCOPHAGE-XR) 500 MG extended release tablet       tamsulosin (FLOMAX) 0.4 MG capsule Take 0.4 mg by mouth      Magnesium Oxide 250 MG TABS Take 1 tablet by mouth daily 30 tablet 2    nitroGLYCERIN (NITROSTAT) 0.4 MG SL tablet up to max of 3 total doses. If no relief after 1 dose, call 911. (Patient taking differently: as needed up to max of 3 total doses. If no relief after 1 dose, call 911.) 25 tablet 0    Multiple Vitamin (MULTIVITAMINS PO) Take 1 tablet by mouth      aspirin 81 MG tablet Take 81 mg by mouth daily       No current facility-administered medications for this visit.      Allergies: Atorvastatin  Review of Systems:    Constitutional: Negative for diaphoresis and fatigue  Respiratory: Negative for shortness of breath  Cardiovascular: Negative for chest pain, dyspnea on exertion, claudication, edema, irregular heartbeat, murmur, palpitations or shortness of breath  Musculoskeletal: Negative for muscle pain, muscular weakness, negative for pain in arm and leg or swelling in foot and leg    Objective:  /74   Pulse 64   Ht 6' (1.829 m)   Wt 219 lb 3.2 oz (99.4 kg)   BMI 29.73 kg/m²   Wt Readings from Last 3 Encounters:   02/22/23 219 lb 3.2 oz (99.4 kg)   08/11/22 219 lb (99.3 kg)   08/03/22 217 lb (98.4 kg)     Body mass index is 29.73 kg/m². GENERAL - Alert, oriented, pleasant, in no apparent distress. EYES: No jaundice, no conjunctival pallor. Neck - Supple. No jugular venous distention noted. No carotid bruits. Cardiovascular - Normal S1 and S2 without obvious murmur or gallop. Extremities - No cyanosis, clubbing, or significant edema. Pulmonary - No respiratory distress. No wheezes or rales.       MEDICAL DECISION MAKING & DATA REVIEW:    Lab Review   Lab Results   Component Value Date/Time    TROPONINT 0.993 12/29/2018 08:25 AM    TROPONINT 0.882 12/29/2018 02:22 AM     No results found for: BNP, PROBNP  No results found for: INR  Lab Results   Component Value Date    LABA1C 5.7 10/04/2021     Lab Results   Component Value Date    WBC 8.1 12/30/2018    WBC 7.7 12/28/2018    HCT 44.3 12/30/2018    HCT 46.0 12/28/2018    MCV 89.9 12/30/2018    MCV 90.9 12/28/2018     12/30/2018     12/28/2018     Lab Results   Component Value Date    CHOL 169 10/04/2021    CHOL 191 06/05/2020    TRIG 95 10/04/2021    TRIG 100 06/05/2020    HDL 60 10/04/2021    HDL 52 06/05/2020    LDLCALC 90 10/04/2021    LDLCALC 119 06/05/2020    LDLDIRECT 233 (H) 12/29/2018     Lab Results   Component Value Date    ALT 24 10/04/2021    ALT 23 06/05/2020    AST 28 10/04/2021    AST 24 06/05/2020     BMP:    Lab Results   Component Value Date/Time     10/04/2021 12:00 AM     06/05/2020 12:00 AM    K 4.3 10/04/2021 12:00 AM    K 4.2 06/05/2020 12:00 AM    CL 99 10/04/2021 12:00 AM    CL 97 06/05/2020 12:00 AM    CO2 30 10/04/2021 12:00 AM    CO2 29 06/05/2020 12:00 AM    BUN 19 10/04/2021 12:00 AM    BUN 20 06/05/2020 12:00 AM    CREATININE 1.0 10/04/2021 12:00 AM    CREATININE 1.1 06/05/2020 12:00 AM     CMP:   Lab Results   Component Value Date/Time     10/04/2021 12:00 AM     06/05/2020 12:00 AM    K 4.3 10/04/2021 12:00 AM    K 4.2 06/05/2020 12:00 AM    CL 99 10/04/2021 12:00 AM    CL 97 06/05/2020 12:00 AM    CO2 30 10/04/2021 12:00 AM    CO2 29 06/05/2020 12:00 AM    BUN 19 10/04/2021 12:00 AM    BUN 20 06/05/2020 12:00 AM    CREATININE 1.0 10/04/2021 12:00 AM    CREATININE 1.1 06/05/2020 12:00 AM    PROT 6.9 12/28/2018 10:22 PM     No results found for: TSH, TSHHS    QUALITY MEASURES REVIEWED:  1.CAD:Patient is taking anti platelet agent:Yes  2. DYSLIPIDEMIA: Patient is on cholesterol lowering medication:Yes   3. Beta-Blocker therapy for CAD, if prior Myocardial Infarction:Yes   4. Counselled regarding smoking cessation. No   Patient does not Smoke. 5.Anticoagulation therapy (for A.Fib) No   Does Not have A.Fib.  6.Discussed weight management strategies. Assessment & Plan:  Primary / Secondary prevention is the goal by aggressive risk modification, healthy and therapeutic life style changes for cardiovascular risk reduction. CORONARY ARTERY DISEASE:Yes   clinically stable. Patient is on optimal medical regimen ( see medication list above )  -   Patient is having recurrence of symptoms from CAD. - changes in  treatment:   no, on ASA           - Testing ordered:  no  [facility] classification: 1  8/3/2022    Normal study. Good exercise capacity. 10 METs work load. Physiological BP response to exercise. ETT negative for Ischemia / Arrhythmia. Normal perfusion study with normal distribution in all coronal, short, and    horizontal axis.     The observed defect is consistent with diaphragmatic attenuation. Normal LV function. LVEF is 62 %. HTN:well controlled on current medical regimen, see list above. - changes in  treatment:   no, on Toprol XL  ECHO 5/2019   Left ventricular systolic function is normal with an ejection fraction of   50-55%. No significant valvular disease or diastolic dysfunction noted. Essentially normal echocardiogram.      CARDIOMYOPATHY: None known   CONGESTIVE HEART FAILURE: NO KNOWN HISTORY.     VHD: No significant VHD noted  DYSLIPIDEMIA: Patient's recent profile is not available. Tolerating current medical regimen wellyes,  takes Repatha. See most recent Lab values in Labs section above. KRISSY: Patient had stopped using  C-Pap & CONRADO came back so he started using it again. ARRHYTHMIAS: H/O Increased CONRADO . TESTS ORDERED:none this visit     PREVIOUSLY ORDERED TESTS REVIEWED & DISCUSSED WITH THE PATIENT:     I personally reviewed & interpreted, all previously ordered tests as copied above. Latest Labs are pulled in to the note with dates. Labs, specially in Reference to Lipid profile, Cardiac testing in the form of Echo ( dated: ), stress tests ( dated: ) & other relevant cardiac testing reviewed with patient & recommendations made based on assessment of the results. Discussed role of Cardiac risk factors & effects + treatment of co morbidities with patient & advised accordingly. MEDICATIONS: List of medications patient is currently taking is reviewed in detail with the patient. Discussed any side effects or problems taking the medication. Recommend Continue present management & medications as listed. AFFIRMATION: I spent at least 20 minutes of time reviewing patient's history, previous & current medical problems & all Labs + testing. This includes chart prep even prior to the vosit.  Various goals are discussed and multiple questions answered. Relevant concelling performed. Office follow up in six months.

## 2023-02-22 NOTE — PATIENT INSTRUCTIONS
CORONARY ARTERY DISEASE:Yes   clinically stable. Patient is on optimal medical regimen ( see medication list above )  -   Patient is having recurrence of symptoms from CAD. - changes in  treatment:   no, on ASA           - Testing ordered:  no  [facility] classification: 1  8/3/2022    Normal study. Good exercise capacity. 10 METs work load. Physiological BP response to exercise. ETT negative for Ischemia / Arrhythmia. Normal perfusion study with normal distribution in all coronal, short, and    horizontal axis. The observed defect is consistent with diaphragmatic attenuation. Normal LV function. LVEF is 62 %. HTN:well controlled on current medical regimen, see list above. - changes in  treatment:   no, on Toprol XL  ECHO 5/2019   Left ventricular systolic function is normal with an ejection fraction of   50-55%. No significant valvular disease or diastolic dysfunction noted. Essentially normal echocardiogram.      CARDIOMYOPATHY: None known   CONGESTIVE HEART FAILURE: NO KNOWN HISTORY.     VHD: No significant VHD noted  DYSLIPIDEMIA: Patient's recent profile is not available. Tolerating current medical regimen wellyes,  takes Repatha. See most recent Lab values in Labs section above. KRISSY: Patient had stopped using  C-Pap & CONRADO came back so he started using it again. ARRHYTHMIAS: H/O Increased CONRADO . TESTS ORDERED:none this visit     PREVIOUSLY ORDERED TESTS REVIEWED & DISCUSSED WITH THE PATIENT:     I personally reviewed & interpreted, all previously ordered tests as copied above. Latest Labs are pulled in to the note with dates.    Labs, specially in Reference to Lipid profile, Cardiac testing in the form of Echo ( dated: ), stress tests ( dated: ) & other relevant cardiac testing reviewed with patient & recommendations made based on assessment of the results. Discussed role of Cardiac risk factors & effects + treatment of co morbidities with patient & advised accordingly. MEDICATIONS: List of medications patient is currently taking is reviewed in detail with the patient. Discussed any side effects or problems taking the medication. Recommend Continue present management & medications as listed. AFFIRMATION: I spent at least 20 minutes of time reviewing patient's history, previous & current medical problems & all Labs + testing. This includes chart prep even prior to the vosit. Various goals are discussed and multiple questions answered. Relevant concelling performed. Office follow up in six months.

## 2023-02-28 ENCOUNTER — TELEPHONE (OUTPATIENT)
Dept: CARDIOLOGY CLINIC | Age: 68
End: 2023-02-28

## 2023-08-30 ENCOUNTER — OFFICE VISIT (OUTPATIENT)
Dept: CARDIOLOGY CLINIC | Age: 68
End: 2023-08-30
Payer: MEDICARE

## 2023-08-30 VITALS
DIASTOLIC BLOOD PRESSURE: 82 MMHG | HEART RATE: 56 BPM | HEIGHT: 72 IN | SYSTOLIC BLOOD PRESSURE: 126 MMHG | WEIGHT: 215.8 LBS | BODY MASS INDEX: 29.23 KG/M2

## 2023-08-30 DIAGNOSIS — E78.5 DYSLIPIDEMIA: ICD-10-CM

## 2023-08-30 DIAGNOSIS — I21.4 NSTEMI (NON-ST ELEVATED MYOCARDIAL INFARCTION) (HCC): ICD-10-CM

## 2023-08-30 DIAGNOSIS — I25.10 CORONARY ARTERY DISEASE INVOLVING NATIVE CORONARY ARTERY OF NATIVE HEART WITHOUT ANGINA PECTORIS: Primary | ICD-10-CM

## 2023-08-30 DIAGNOSIS — R00.2 PALPITATIONS: ICD-10-CM

## 2023-08-30 DIAGNOSIS — Z95.820 S/P ANGIOPLASTY WITH STENT: ICD-10-CM

## 2023-08-30 DIAGNOSIS — I49.3 PVC (PREMATURE VENTRICULAR CONTRACTION): ICD-10-CM

## 2023-08-30 DIAGNOSIS — G47.33 OSA (OBSTRUCTIVE SLEEP APNEA): ICD-10-CM

## 2023-08-30 LAB
ALBUMIN SERPL-MCNC: 4.6 G/DL
ALP BLD-CCNC: 78 U/L
ALT SERPL-CCNC: 26 U/L
ANION GAP SERPL CALCULATED.3IONS-SCNC: NORMAL MMOL/L
AST SERPL-CCNC: 33 U/L
BASOPHILS ABSOLUTE: 0.1 /ΜL
BASOPHILS RELATIVE PERCENT: 1 %
BILIRUB SERPL-MCNC: 0.4 MG/DL (ref 0.1–1.4)
BUN BLDV-MCNC: 18 MG/DL
CALCIUM SERPL-MCNC: 10.2 MG/DL
CHLORIDE BLD-SCNC: 102 MMOL/L
CHOLESTEROL, TOTAL: 177 MG/DL
CHOLESTEROL/HDL RATIO: 1.8
CO2: 28 MMOL/L
CREAT SERPL-MCNC: 1.19 MG/DL
EGFR: 67
EOSINOPHILS ABSOLUTE: 0.2 /ΜL
EOSINOPHILS RELATIVE PERCENT: 4 %
GLUCOSE BLD-MCNC: 99 MG/DL
HCT VFR BLD CALC: 45 % (ref 41–53)
HDLC SERPL-MCNC: 57 MG/DL (ref 35–70)
HEMOGLOBIN: 14.7 G/DL (ref 13.5–17.5)
LDL CHOLESTEROL CALCULATED: 105 MG/DL (ref 0–160)
LYMPHOCYTES ABSOLUTE: 39 /ΜL
LYMPHOCYTES RELATIVE PERCENT: 2 %
MCH RBC QN AUTO: 28.1 PG
MCHC RBC AUTO-ENTMCNC: 32.7 G/DL
MCV RBC AUTO: 86 FL
MONOCYTES ABSOLUTE: 0.5 /ΜL
MONOCYTES RELATIVE PERCENT: 10 %
NEUTROPHILS ABSOLUTE: 2.3 /ΜL
NEUTROPHILS RELATIVE PERCENT: 2.3 %
NONHDLC SERPL-MCNC: NORMAL MG/DL
PLATELET # BLD: 296 K/ΜL
PMV BLD AUTO: NORMAL FL
POTASSIUM SERPL-SCNC: 4.8 MMOL/L
RBC # BLD: 5.24 10^6/ΜL
SODIUM BLD-SCNC: 142 MMOL/L
TOTAL PROTEIN: 7
TRIGL SERPL-MCNC: 83 MG/DL
VLDLC SERPL CALC-MCNC: 15 MG/DL
WBC # BLD: 5.1 10^3/ML

## 2023-08-30 PROCEDURE — 1036F TOBACCO NON-USER: CPT | Performed by: INTERNAL MEDICINE

## 2023-08-30 PROCEDURE — G8417 CALC BMI ABV UP PARAM F/U: HCPCS | Performed by: INTERNAL MEDICINE

## 2023-08-30 PROCEDURE — 3017F COLORECTAL CA SCREEN DOC REV: CPT | Performed by: INTERNAL MEDICINE

## 2023-08-30 PROCEDURE — 99213 OFFICE O/P EST LOW 20 MIN: CPT | Performed by: INTERNAL MEDICINE

## 2023-08-30 PROCEDURE — G8427 DOCREV CUR MEDS BY ELIG CLIN: HCPCS | Performed by: INTERNAL MEDICINE

## 2023-08-30 PROCEDURE — 1123F ACP DISCUSS/DSCN MKR DOCD: CPT | Performed by: INTERNAL MEDICINE

## 2023-08-30 RX ORDER — METOPROLOL SUCCINATE 25 MG/1
25 TABLET, EXTENDED RELEASE ORAL DAILY
Qty: 90 TABLET | Refills: 3 | Status: SHIPPED | OUTPATIENT
Start: 2023-08-30

## 2023-08-30 NOTE — PATIENT INSTRUCTIONS
CORONARY ARTERY DISEASE:Yes   clinically stable. Patient is on optimal medical regimen ( see medication list above )  -   Patient is having recurrence of symptoms from CAD. - changes in  treatment:   no, on ASA           - Testing ordered:  [facility] classification: 1  8/3/2022    Normal study. Good exercise capacity. 10 METs work load. Physiological BP response to exercise. ETT negative for Ischemia / Arrhythmia. Normal perfusion study with normal distribution in all coronal, short, and    horizontal axis. The observed defect is consistent with diaphragmatic attenuation. Normal LV function. LVEF is 62 %. HTN:well controlled on current medical regimen, see list above. - changes in  treatment:   no, on Toprol XL  ECHO 5/2019   Left ventricular systolic function is normal with an ejection fraction of   50-55%. No significant valvular disease or diastolic dysfunction noted. Essentially normal echocardiogram.      CARDIOMYOPATHY: None known   CONGESTIVE HEART FAILURE: NO KNOWN HISTORY.     VHD: No significant VHD noted  DYSLIPIDEMIA: Patient's recent profile is not available. Tolerating current medical regimen wellyes,  takes Repatha. See most recent Lab values in Labs section above. KRISSY: Patient had stopped using  C-Pap & CONRADO came back so he started using it again. ARRHYTHMIAS: H/O Increased CONRADO . TESTS ORDERED: Lipid profile      PREVIOUSLY ORDERED TESTS REVIEWED & DISCUSSED WITH THE PATIENT:     I personally reviewed & interpreted, all previously ordered tests as copied above. Latest Labs are pulled in to the note with dates.    Labs, specially in Reference to Lipid profile, Cardiac testing in the form of Echo ( dated: ), stress tests ( dated: ) & other relevant cardiac testing reviewed with patient & recommendations made based on assessment of the

## 2023-08-30 NOTE — PROGRESS NOTES
Arrhythmia. Normal perfusion study with normal distribution in all coronal, short, and    horizontal axis. The observed defect is consistent with diaphragmatic attenuation. Normal LV function. LVEF is 62 %. HTN:well controlled on current medical regimen, see list above. - changes in  treatment:   no, on Toprol XL  ECHO 5/2019   Left ventricular systolic function is normal with an ejection fraction of   50-55%. No significant valvular disease or diastolic dysfunction noted. Essentially normal echocardiogram.      CARDIOMYOPATHY: None known   CONGESTIVE HEART FAILURE: NO KNOWN HISTORY.     VHD: No significant VHD noted  DYSLIPIDEMIA: Patient's recent profile is not available. Tolerating current medical regimen wellyes,  takes Repatha. See most recent Lab values in Labs section above. KRISSY: Patient had stopped using  C-Pap & CONRADO came back so he started using it again. ARRHYTHMIAS: H/O Increased CONRADO . TESTS ORDERED: Lipid profile      PREVIOUSLY ORDERED TESTS REVIEWED & DISCUSSED WITH THE PATIENT:     I personally reviewed & interpreted, all previously ordered tests as copied above. Latest Labs are pulled in to the note with dates. Labs, specially in Reference to Lipid profile, Cardiac testing in the form of Echo ( dated: ), stress tests ( dated: ) & other relevant cardiac testing reviewed with patient & recommendations made based on assessment of the results. Discussed role of Cardiac risk factors & effects + treatment of co morbidities with patient & advised accordingly. MEDICATIONS: List of medications patient is currently taking is reviewed in detail with the patient. Discussed any side effects or problems taking the medication. Recommend Continue present management & medications as listed.      AFFIRMATION: I spent at least 20 minutes of time reviewing patient's history,

## 2023-09-20 DIAGNOSIS — R00.2 PALPITATIONS: ICD-10-CM

## 2023-09-20 DIAGNOSIS — E78.5 DYSLIPIDEMIA: ICD-10-CM

## 2023-09-20 DIAGNOSIS — Z95.820 S/P ANGIOPLASTY WITH STENT: ICD-10-CM

## 2023-09-20 DIAGNOSIS — I49.3 PVC (PREMATURE VENTRICULAR CONTRACTION): ICD-10-CM

## 2023-09-20 DIAGNOSIS — I25.10 CORONARY ARTERY DISEASE INVOLVING NATIVE CORONARY ARTERY OF NATIVE HEART WITHOUT ANGINA PECTORIS: ICD-10-CM

## 2023-09-20 DIAGNOSIS — I21.4 NSTEMI (NON-ST ELEVATED MYOCARDIAL INFARCTION) (HCC): ICD-10-CM

## 2023-09-20 RX ORDER — METOPROLOL SUCCINATE 25 MG/1
25 TABLET, EXTENDED RELEASE ORAL DAILY
Qty: 90 TABLET | Refills: 3 | OUTPATIENT
Start: 2023-09-20

## 2023-11-10 ENCOUNTER — TELEPHONE (OUTPATIENT)
Dept: CARDIOLOGY CLINIC | Age: 68
End: 2023-11-10

## 2023-11-10 NOTE — TELEPHONE ENCOUNTER
Patient has been approved for a Nadyne Cullman renewal through the [employer] for dates 12/11/23-12/10/24. Received a request for diagnosis verification.  Documents faxed as per request.     CARD NO.  [id number]     CARD STATUS  Active     BIN  [id number]     N  [id number]     [facility]  [id number]

## 2024-02-26 ENCOUNTER — TELEPHONE (OUTPATIENT)
Dept: CARDIOLOGY CLINIC | Age: 69
End: 2024-02-26

## 2024-02-26 NOTE — TELEPHONE ENCOUNTER
Called to remind pt that he has Lipid panel that needs done before appointment. Pt advised that he would bring in more recent results as he has had blood work done.   Nursing notes reviewed and accepted.    [x]  YES    []  NO Interactions with peers  [x]  YES    []  NO Fantasy play  [x]  YES    []  NO Usually understandable  [x]  YES    []  NO Knows name, age, gender  [x]  YES    []  NO Names 4 colors  [x]  YES    []  NO Draws person (3 body parts)  [x]  YES    []  NO Plays board/card games  [x]  YES    []  NO Hops on 1 foot  [x]  YES    []  NO Balances on 1 foot for 2 seconds  [x]  YES    []  NO Builds tower (8 blocks)  [x]  YES    []  NO Copies a cross  [x]  YES    []  NO Brushes own teeth  [x]  YES    []  NO Dresses self    Aranza Palumbo is a 5 year old female who presents for 5 year old well child exam.  Patient presents with Father.    Concerns raised today include:     Birth history, medical history, surgical history, and family history reviewed and updated.    PHYSICAL EXAM:  Blood pressure 96/56, pulse 120, temperature 99.1 °F (37.3 °C), resp. rate 20, height 3' 5.5\" (1.054 m), weight 16.3 kg.  GENERAL:  Well appearing  female, nontoxic, no acute distress.  Alert and interactive.  SKIN: Warm, normal turgor.  No cyanosis.  No bruises or lesions.  HEAD:  Normocephalic, atraumatic.    EYES:  Conjunctivae without injection or icterus.  PERRL, EOMI(Pupils equal, round, reactive to light and accommodation, extraocular movements intact).  NOSE: No flaring.  EARS:  TMs(Tympanic membranes) transparent with good landmarks.  THROAT:  Oropharynx with moist mucus membranes and no lesions.  NECK:  Supple, no lymphadenopathy or masses.  HEART:  Regular rate and rhythm.  Quiet precordium.  Normal S1, S2.  No murmurs, rubs, gallops.   LUNGS:  Clear to auscultation bilaterally.  No wheezes, rales, rhonchi.  Normal work of breathing.  ABDOMEN:  Soft, nontender.  No organomegaly or masses.  GENITALIA:  Performed, mild erythema around labia  EXTREMITIES:  Warm, dry.  3rd toes on both feet R>L significantly curled up.  NEUROLOGIC:  Normal tone, bulk, strength.    ASSESSMENT:  5 year  old female well child with abnormal 3rd toes on both feet.      PLAN:  All parental concerns and questions discussed.  Nystatin cream prescribed  Referral placed to podiatry.  Immunizations per orders.    RTC in 1 year for WCE or sooner prn illness/concerns.

## 2024-02-29 ENCOUNTER — OFFICE VISIT (OUTPATIENT)
Dept: CARDIOLOGY CLINIC | Age: 69
End: 2024-02-29
Payer: MEDICARE

## 2024-02-29 VITALS
DIASTOLIC BLOOD PRESSURE: 68 MMHG | HEART RATE: 68 BPM | OXYGEN SATURATION: 97 % | SYSTOLIC BLOOD PRESSURE: 118 MMHG | WEIGHT: 206.6 LBS | HEIGHT: 77 IN | BODY MASS INDEX: 24.39 KG/M2

## 2024-02-29 DIAGNOSIS — I21.4 NSTEMI (NON-ST ELEVATED MYOCARDIAL INFARCTION) (HCC): ICD-10-CM

## 2024-02-29 DIAGNOSIS — G47.33 OSA (OBSTRUCTIVE SLEEP APNEA): ICD-10-CM

## 2024-02-29 DIAGNOSIS — I25.10 CORONARY ARTERY DISEASE INVOLVING NATIVE CORONARY ARTERY OF NATIVE HEART WITHOUT ANGINA PECTORIS: Primary | ICD-10-CM

## 2024-02-29 DIAGNOSIS — Z95.820 S/P ANGIOPLASTY WITH STENT: ICD-10-CM

## 2024-02-29 DIAGNOSIS — E78.5 DYSLIPIDEMIA: ICD-10-CM

## 2024-02-29 PROCEDURE — 99214 OFFICE O/P EST MOD 30 MIN: CPT | Performed by: INTERNAL MEDICINE

## 2024-02-29 PROCEDURE — 3017F COLORECTAL CA SCREEN DOC REV: CPT | Performed by: INTERNAL MEDICINE

## 2024-02-29 PROCEDURE — 1123F ACP DISCUSS/DSCN MKR DOCD: CPT | Performed by: INTERNAL MEDICINE

## 2024-02-29 PROCEDURE — G8427 DOCREV CUR MEDS BY ELIG CLIN: HCPCS | Performed by: INTERNAL MEDICINE

## 2024-02-29 PROCEDURE — G8484 FLU IMMUNIZE NO ADMIN: HCPCS | Performed by: INTERNAL MEDICINE

## 2024-02-29 PROCEDURE — 1036F TOBACCO NON-USER: CPT | Performed by: INTERNAL MEDICINE

## 2024-02-29 PROCEDURE — G8420 CALC BMI NORM PARAMETERS: HCPCS | Performed by: INTERNAL MEDICINE

## 2024-02-29 RX ORDER — MULTIVIT-MIN/IRON/FOLIC ACID/K 18-600-40
CAPSULE ORAL
COMMUNITY

## 2024-02-29 RX ORDER — THIAMINE MONONITRATE (VIT B1) 100 MG
100 TABLET ORAL DAILY
COMMUNITY

## 2024-02-29 RX ORDER — CYANOCOBALAMIN (VITAMIN B-12) 500 MCG
300 TABLET ORAL 4 TIMES DAILY
COMMUNITY

## 2024-02-29 NOTE — PROGRESS NOTES
12/30/2018     Lab Results   Component Value Date    CHOL 164 11/09/2022    CHOL 169 10/04/2021    TRIG 82 11/09/2022    TRIG 95 10/04/2021    HDL 47 11/09/2022    HDL 60 10/04/2021    LDLCALC 102 11/09/2022    LDLCALC 90 10/04/2021    LDLDIRECT 233 (H) 12/29/2018     Lab Results   Component Value Date    ALT 26 11/09/2022    ALT 24 10/04/2021    AST 29 11/09/2022    AST 28 10/04/2021     BMP:    Lab Results   Component Value Date/Time     11/09/2022 12:00 AM     10/04/2021 12:00 AM    K 4.3 11/09/2022 12:00 AM    K 4.3 10/04/2021 12:00 AM     11/09/2022 12:00 AM    CL 99 10/04/2021 12:00 AM    CO2 26 11/09/2022 12:00 AM    CO2 30 10/04/2021 12:00 AM    BUN 22 11/09/2022 12:00 AM    BUN 19 10/04/2021 12:00 AM    CREATININE 1.14 11/09/2022 12:00 AM    CREATININE 1.0 10/04/2021 12:00 AM     CMP:   Lab Results   Component Value Date/Time     11/09/2022 12:00 AM     10/04/2021 12:00 AM    K 4.3 11/09/2022 12:00 AM    K 4.3 10/04/2021 12:00 AM     11/09/2022 12:00 AM    CL 99 10/04/2021 12:00 AM    CO2 26 11/09/2022 12:00 AM    CO2 30 10/04/2021 12:00 AM    BUN 22 11/09/2022 12:00 AM    BUN 19 10/04/2021 12:00 AM    CREATININE 1.14 11/09/2022 12:00 AM    CREATININE 1.0 10/04/2021 12:00 AM    PROT 6.9 12/28/2018 10:22 PM     No results found for: \"TSH\", \"TSHHS\"    QUALITY MEASURES REVIEWED:  1.CAD:Patient is taking anti platelet agent:Yes  2.DYSLIPIDEMIA: Patient is on cholesterol lowering medication:Yes   3.Beta-Blocker therapy for CAD, if prior Myocardial Infarction:Yes  4.Counselled regarding smoking cessation. No   Patient does not Smoke.  5.Anticoagulation therapy (for A.Fib) No   Does Not have A.Fib.  6.Discussed weight management strategies.    Assessment & Plan:  Primary / Secondary prevention is the goal by aggressive risk modification, healthy and therapeutic life style changes for cardiovascular risk reduction.     CORONARY ARTERY DISEASE:Yes   clinically stable. Patient is

## 2024-02-29 NOTE — PATIENT INSTRUCTIONS
role of Cardiac risk factors & effects + treatment of co morbidities with patient & advised accordingly.     MEDICATIONS: List of medications patient is currently taking is reviewed in detail with the patient. Discussed any side effects or problems taking the medication.     Recommend: Increase Praluent to 150 mg q 2 weeks.     AFFIRMATION: I spent at least 20 minutes of time reviewing patient's history, previous & current medical problems & all Labs + testing. This includes chart prep even prior to the vosit. Various goals are discussed and multiple questions answered.Relevant concelling performed.     Office follow up in six months.

## 2024-04-10 LAB
CHOLESTEROL, TOTAL: 177 MG/DL
CHOLESTEROL/HDL RATIO: NORMAL
HDLC SERPL-MCNC: 54 MG/DL (ref 35–70)
LDL CHOLESTEROL CALCULATED: 108 MG/DL (ref 0–160)
NONHDLC SERPL-MCNC: NORMAL MG/DL
TRIGL SERPL-MCNC: 82 MG/DL
VLDLC SERPL CALC-MCNC: 15 MG/DL

## 2024-05-13 RX ORDER — NITROGLYCERIN 0.4 MG/1
TABLET SUBLINGUAL
Qty: 25 TABLET | Refills: 1 | Status: SHIPPED | OUTPATIENT
Start: 2024-05-13

## 2024-05-13 NOTE — TELEPHONE ENCOUNTER
Pt did not specify 30,60, or 90 days. Pt stated his RX went through the [facility] and is needing a refill.

## 2024-09-04 ENCOUNTER — OFFICE VISIT (OUTPATIENT)
Dept: CARDIOLOGY CLINIC | Age: 69
End: 2024-09-04

## 2024-09-04 VITALS
BODY MASS INDEX: 27.04 KG/M2 | WEIGHT: 204 LBS | HEIGHT: 73 IN | HEART RATE: 61 BPM | SYSTOLIC BLOOD PRESSURE: 122 MMHG | DIASTOLIC BLOOD PRESSURE: 76 MMHG

## 2024-09-04 DIAGNOSIS — G47.33 OSA (OBSTRUCTIVE SLEEP APNEA): ICD-10-CM

## 2024-09-04 DIAGNOSIS — Z95.820 S/P ANGIOPLASTY WITH STENT: ICD-10-CM

## 2024-09-04 DIAGNOSIS — I25.10 CORONARY ARTERY DISEASE INVOLVING NATIVE CORONARY ARTERY OF NATIVE HEART WITHOUT ANGINA PECTORIS: Primary | ICD-10-CM

## 2024-09-04 DIAGNOSIS — I21.4 NSTEMI (NON-ST ELEVATED MYOCARDIAL INFARCTION) (HCC): ICD-10-CM

## 2024-09-04 DIAGNOSIS — E78.5 DYSLIPIDEMIA: ICD-10-CM

## 2024-09-04 RX ORDER — NITROGLYCERIN 0.4 MG/1
0.4 TABLET SUBLINGUAL EVERY 5 MIN PRN
Qty: 25 TABLET | Refills: 3 | Status: SHIPPED | OUTPATIENT
Start: 2024-09-04

## 2024-09-04 NOTE — PATIENT INSTRUCTIONS
CORONARY ARTERY DISEASE:Yes   clinically stable. Patient is on optimal medical regimen ( see medication list above )  -   Patient is having recurrence of symptoms from CAD.           - changes in  treatment:   no, on ASA           - Testing ordered:  no  [place] classification: 1  8/3/2022    Normal study.    Good exercise capacity. 10 METs work load.    Physiological BP response to exercise.    ETT negative for Ischemia / Arrhythmia.    Normal perfusion study with normal distribution in all coronal, short, and    horizontal axis.    The observed defect is consistent with diaphragmatic attenuation.    Normal LV function. LVEF is 62 %.      EKG: NSR, 61/min, WNL    HTN:well controlled on current medical regimen, see list above.            - changes in  treatment:   no, on Toprol XL  ECHO 5/2019   Left ventricular systolic function is normal with an ejection fraction of   50-55%.   No significant valvular disease or diastolic dysfunction noted.   Essentially normal echocardiogram.      CARDIOMYOPATHY: None known   CONGESTIVE HEART FAILURE: NO KNOWN HISTORY.     VHD: No significant VHD noted  DYSLIPIDEMIA: Patient's recent profile is not available.                                Tolerating current medical regimen wellyes,  takes Repatha.                                                          See most recent Lab values in Labs section above.     KRISSY: Patient had stopped using  C-Pap & CONRADO came back so he started using it again.      ARRHYTHMIAS: H/O Increased CONRADO .     TESTS ORDERED:Lipid profile.     PREVIOUSLY ORDERED TESTS REVIEWED & DISCUSSED WITH THE PATIENT:     I personally reviewed & interpreted, all previously ordered tests as copied above. Latest Labs are pulled in to the note with dates.   Labs, specially in Reference to Lipid profile, Cardiac testing in the form of Echo ( dated: ), stress tests ( dated: ) & other relevant cardiac testing reviewed with patient & recommendations made based on assessment of

## 2024-09-04 NOTE — PROGRESS NOTES
OFFICE PROGRESS NOTES      Joe JO is a 68 y.o. male who has    CHIEF COMPLAINT AS FOLLOWS:  CHEST PAIN:  Patient denies any C/O chest pains at this time.                                SOB: No C/O SOB at this time.                 LEG EDEMA: No leg edema   PALPITATIONS:  No C/O palpitations.   DIZZINESS: No C/O Dizziness      SYNCOPE: None   OTHER/ ADDITIONAL COMPLAINTS:                                     HPI: Patient is here for F/U on his CAD & Dyslipidemia problems.   CAD: Patient has known CAD. Had angioplasty in the past.  Dyslipidemia: Patient has known mixed dyslipidemia. Has been treated with guideline recommended medical / physical/ diet therapy as stated below.                Current Outpatient Medications   Medication Sig Dispense Refill    vitamin B-1 (THIAMINE) 100 MG tablet Take 1 tablet by mouth daily      Cholecalciferol (VITAMIN D) 50 MCG (2000 UT) CAPS capsule Take by mouth      Coenzyme Q10 (Q-10 CO-ENZYME PO) Take by mouth      alirocumab (PRALUENT) 75 MG/ML SOAJ injection pen Inject 2 mLs into the skin every 14 days 6 Adjustable Dose Pre-filled Pen Syringe 4    metoprolol succinate (TOPROL XL) 25 MG extended release tablet Take 1 tablet by mouth daily 90 tablet 3    nitroGLYCERIN (NITROSTAT) 0.4 MG SL tablet Place 1 tablet under the tongue every 5 minutes as needed for Chest pain up to max of 3 total doses. If no relief after 1 dose, call 911. 25 tablet 3    tamsulosin (FLOMAX) 0.4 MG capsule Take 1 capsule by mouth Pt states BID      Magnesium Oxide 250 MG TABS Take 1 tablet by mouth daily 30 tablet 2    Multiple Vitamin (MULTIVITAMINS PO) Take 1 tablet by mouth      aspirin 81 MG tablet Take 1 tablet by mouth daily      nitroGLYCERIN (NITROSTAT) 0.4 MG SL tablet up to max of 3 total doses. If no relief after 1 dose, call 911. 25 tablet 1    Omega-3 Fatty Acids (FISH OIL) 300 MG CAPS Take 300 mg by mouth in the morning, at noon, in the evening, and at bedtime      metFORMIN

## 2024-10-01 DIAGNOSIS — I49.3 PVC (PREMATURE VENTRICULAR CONTRACTION): ICD-10-CM

## 2024-10-01 DIAGNOSIS — I21.4 NSTEMI (NON-ST ELEVATED MYOCARDIAL INFARCTION) (HCC): ICD-10-CM

## 2024-10-01 DIAGNOSIS — I25.10 CORONARY ARTERY DISEASE INVOLVING NATIVE CORONARY ARTERY OF NATIVE HEART WITHOUT ANGINA PECTORIS: ICD-10-CM

## 2024-10-01 DIAGNOSIS — Z95.820 S/P ANGIOPLASTY WITH STENT: ICD-10-CM

## 2024-10-01 DIAGNOSIS — R00.2 PALPITATIONS: ICD-10-CM

## 2024-10-01 DIAGNOSIS — E78.5 DYSLIPIDEMIA: ICD-10-CM

## 2024-10-01 RX ORDER — METOPROLOL SUCCINATE 25 MG/1
25 TABLET, EXTENDED RELEASE ORAL DAILY
Qty: 90 TABLET | Refills: 3 | Status: SHIPPED | OUTPATIENT
Start: 2024-10-01

## 2024-10-02 DIAGNOSIS — I49.3 PVC (PREMATURE VENTRICULAR CONTRACTION): ICD-10-CM

## 2024-10-02 DIAGNOSIS — Z95.820 S/P ANGIOPLASTY WITH STENT: ICD-10-CM

## 2024-10-02 DIAGNOSIS — E78.5 DYSLIPIDEMIA: ICD-10-CM

## 2024-10-02 DIAGNOSIS — R00.2 PALPITATIONS: ICD-10-CM

## 2024-10-02 DIAGNOSIS — I25.10 CORONARY ARTERY DISEASE INVOLVING NATIVE CORONARY ARTERY OF NATIVE HEART WITHOUT ANGINA PECTORIS: ICD-10-CM

## 2024-10-02 DIAGNOSIS — I21.4 NSTEMI (NON-ST ELEVATED MYOCARDIAL INFARCTION) (HCC): ICD-10-CM

## 2024-10-02 RX ORDER — METOPROLOL SUCCINATE 25 MG/1
25 TABLET, EXTENDED RELEASE ORAL DAILY
Qty: 90 TABLET | Refills: 3 | OUTPATIENT
Start: 2024-10-02

## 2024-11-12 ENCOUNTER — TELEPHONE (OUTPATIENT)
Dept: CARDIOLOGY CLINIC | Age: 69
End: 2024-11-12

## 2024-11-12 NOTE — TELEPHONE ENCOUNTER
Patient is due for [employer] william for Praluent. Spoke with patient who advised that he would like to renew this william.     William renewal request submitted and has been approved.    Card No.  [id number]    Card Status  Active    BIN  [id number]    PCN  [id number]     Group  [id number]      Will notify pharmacy on 12/11/24 of new william information.

## 2025-02-06 RX ORDER — ALIROCUMAB 75 MG/ML
75 INJECTION, SOLUTION SUBCUTANEOUS
Qty: 6 ML | Refills: 3 | Status: SHIPPED | OUTPATIENT
Start: 2025-02-06

## 2025-04-10 NOTE — PROGRESS NOTES
Patient Name: Joe Lazar  : 1955  MRN# [id number]    REASON FOR VISIT: 6 month follow  Patient Active Problem List    Diagnosis Date Noted    KRISSY (obstructive sleep apnea) 2020    History of palpitations 12/10/2019    S/P angioplasty with stent 01/10/2019    Coronary artery disease involving native coronary artery of native heart without angina pectoris 01/10/2019    NSTEMI (non-ST elevated myocardial infarction) (HCC) 2018    Chest pain     Dyslipidemia      LABS:  Lab Results   Component Value Date    CHOL 177 04/10/2024    TRIG 82 04/10/2024    HDL 54 04/10/2024    LDLDIRECT 233 (H) 2018    CHOLHDLRATIO 1.8 2023     Hemoglobin A1C   Date Value Ref Range Status   2022 5.7 % Final

## 2025-04-11 ENCOUNTER — TELEPHONE (OUTPATIENT)
Dept: CARDIOLOGY CLINIC | Age: 70
End: 2025-04-11

## 2025-04-11 NOTE — TELEPHONE ENCOUNTER
Pt returned call to [facility]. His primary care order Lipid panel and had done there. He will bring paper copy of results to visit.

## 2025-04-17 ENCOUNTER — OFFICE VISIT (OUTPATIENT)
Dept: CARDIOLOGY CLINIC | Age: 70
End: 2025-04-17
Payer: MEDICARE

## 2025-04-17 VITALS
HEART RATE: 59 BPM | SYSTOLIC BLOOD PRESSURE: 114 MMHG | BODY MASS INDEX: 28.85 KG/M2 | WEIGHT: 213 LBS | HEIGHT: 72 IN | DIASTOLIC BLOOD PRESSURE: 74 MMHG

## 2025-04-17 DIAGNOSIS — I25.10 CORONARY ARTERY DISEASE INVOLVING NATIVE CORONARY ARTERY OF NATIVE HEART WITHOUT ANGINA PECTORIS: Primary | ICD-10-CM

## 2025-04-17 DIAGNOSIS — E78.5 DYSLIPIDEMIA: ICD-10-CM

## 2025-04-17 DIAGNOSIS — G47.33 OSA (OBSTRUCTIVE SLEEP APNEA): ICD-10-CM

## 2025-04-17 DIAGNOSIS — I21.4 NSTEMI (NON-ST ELEVATED MYOCARDIAL INFARCTION) (HCC): ICD-10-CM

## 2025-04-17 DIAGNOSIS — Z95.820 S/P ANGIOPLASTY WITH STENT: ICD-10-CM

## 2025-04-17 PROCEDURE — 1036F TOBACCO NON-USER: CPT | Performed by: INTERNAL MEDICINE

## 2025-04-17 PROCEDURE — 1159F MED LIST DOCD IN RCRD: CPT | Performed by: INTERNAL MEDICINE

## 2025-04-17 PROCEDURE — 93000 ELECTROCARDIOGRAM COMPLETE: CPT | Performed by: INTERNAL MEDICINE

## 2025-04-17 PROCEDURE — G8419 CALC BMI OUT NRM PARAM NOF/U: HCPCS | Performed by: INTERNAL MEDICINE

## 2025-04-17 PROCEDURE — 1123F ACP DISCUSS/DSCN MKR DOCD: CPT | Performed by: INTERNAL MEDICINE

## 2025-04-17 PROCEDURE — 3017F COLORECTAL CA SCREEN DOC REV: CPT | Performed by: INTERNAL MEDICINE

## 2025-04-17 PROCEDURE — 99214 OFFICE O/P EST MOD 30 MIN: CPT | Performed by: INTERNAL MEDICINE

## 2025-04-17 PROCEDURE — G8427 DOCREV CUR MEDS BY ELIG CLIN: HCPCS | Performed by: INTERNAL MEDICINE

## 2025-04-17 RX ORDER — EZETIMIBE 10 MG/1
10 TABLET ORAL DAILY
Qty: 30 TABLET | Refills: 5 | Status: SHIPPED | OUTPATIENT
Start: 2025-04-17

## 2025-04-17 NOTE — PATIENT INSTRUCTIONS
- changes in  treatment:   no, on Toprol XL  ECHO 5/2019   Left ventricular systolic function is normal with an ejection fraction of   50-55%.   No significant valvular disease or diastolic dysfunction noted.   Essentially normal echocardiogram.      CARDIOMYOPATHY: None known   CONGESTIVE HEART FAILURE: NO KNOWN HISTORY.     VHD: No significant VHD noted  DYSLIPIDEMIA: Patient's recent profile is not available.                                Tolerating current medical regimen wellyes,  takes Repatha.                                                          See most recent Lab values in Labs section above.     KRISSY: Patient had stopped using  C-Pap & CONRADO came back so he started using it again.      ARRHYTHMIAS: H/O Increased CONRADO .     TESTS ORDERED:Lipid profile prior to next visit     PREVIOUSLY ORDERED TESTS REVIEWED & DISCUSSED WITH THE PATIENT:     I personally reviewed & interpreted, all previously ordered tests as copied above. Latest Labs are pulled in to the note with dates.   Labs, specially in Reference to Lipid profile, Cardiac testing in the form of Echo ( dated: ), stress tests ( dated: ) & other relevant cardiac testing reviewed with patient & recommendations made based on assessment of the results.    Discussed role of Cardiac risk factors & effects + treatment of co morbidities with patient & advised accordingly.     MEDICATIONS: List of medications patient is currently taking is reviewed in detail with the patient. Discussed any side effects or problems taking the medication.     Recommend Continue present management & medications as listed. Add Zetia to the regimen.    AFFIRMATION: I reviewed patient's history, previous & current medical problems & all Labs + testing. This includes chart prep even prior to the vosit. Various goals are discussed and multiple questions answered.Relevant concelling performed.     Office follow up in six months.

## 2025-04-17 NOTE — PROGRESS NOTES
OFFICE PROGRESS NOTES      Joe JO is a 69 y.o. male who has    CHIEF COMPLAINT AS FOLLOWS:  CHEST PAIN:  Patient denies any C/O chest pains at this time.                                SOB: No C/O SOB at this time.                 LEG EDEMA: No leg edema   PALPITATIONS:  No C/O palpitations.   DIZZINESS: No C/O Dizziness    SYNCOPE: None   OTHER/ ADDITIONAL COMPLAINTS:                                     HPI: Patient is here for F/U on his CAD & Dyslipidemia problems.   CAD: Patient has known CAD. Had angioplasty in the past.  Dyslipidemia: Patient has known mixed dyslipidemia. Has been treated with guideline recommended medical / physical/ diet therapy as stated below.                Current Outpatient Medications   Medication Sig Dispense Refill    alirocumab (PRALUENT) 75 MG/ML SOAJ injection pen Inject 1 mL into the skin every 14 days 6 mL 3    metoprolol succinate (TOPROL XL) 25 MG extended release tablet Take 1 tablet by mouth daily 90 tablet 3    nitroGLYCERIN (NITROSTAT) 0.4 MG SL tablet Place 1 tablet under the tongue every 5 minutes as needed for Chest pain up to max of 3 total doses. If no relief after 1 dose, call 911. 25 tablet 3    vitamin B-1 (THIAMINE) 100 MG tablet Take 1 tablet by mouth daily      Cholecalciferol (VITAMIN D) 50 MCG (2000 UT) CAPS capsule Take by mouth      Coenzyme Q10 (Q-10 CO-ENZYME PO) Take by mouth      tamsulosin (FLOMAX) 0.4 MG capsule Take 1 capsule by mouth Pt states BID      Magnesium Oxide 250 MG TABS Take 1 tablet by mouth daily 30 tablet 2    Multiple Vitamin (MULTIVITAMINS PO) Take 1 tablet by mouth      aspirin 81 MG tablet Take 1 tablet by mouth daily      nitroGLYCERIN (NITROSTAT) 0.4 MG SL tablet up to max of 3 total doses. If no relief after 1 dose, call 911. 25 tablet 1    Omega-3 Fatty Acids (FISH OIL) 300 MG CAPS Take 300 mg by mouth in the morning, at noon, in the evening, and at bedtime      metFORMIN (GLUCOPHAGE-XR) 500 MG extended release tablet

## 2025-04-17 NOTE — PROGRESS NOTES
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 - No      Have you had any Shortness of Breath - Pt thinks he gets out of breath more often than he used to.   When did it begin? -  Has been gradually worsening    If Yes - When on exertion  How many flights of stairs can you go up without having SOB? - 6    Have you had any dizziness - No    Have you had any palpitations recently? - No    Do you have any edema - swelling in No        When did you have your last labs drawn PCP recently, pt forgot to bring lab copies in  What doctor ordered Higinio Bloom PA  Do we have the labs in their chart No    If we do not have these labs, you are retrieve these labs for the provider!    Do you need any prescriptions refilled? - No    Do you have a surgery or procedure scheduled in the near future - No      Check medication list thoroughly!!! AND RECONCILE OUTSIDE MEDICATIONS  If dose has changed change the entire order not just the MG  BE SURE TO ASK PATIENT IF THEY NEED MEDICATION REFILLS  Verify Pharmacy and update if incorrect    Add to every patient's \"wrap up\" the following dot phrase AFTERVISITCARDIOHEARTHOUSE and ensure we explain this to our patients

## 2025-05-02 RX ORDER — ALIROCUMAB 75 MG/ML
75 INJECTION, SOLUTION SUBCUTANEOUS
Qty: 6 ML | Refills: 3 | Status: SHIPPED | OUTPATIENT
Start: 2025-05-02